# Patient Record
Sex: FEMALE | Race: WHITE | NOT HISPANIC OR LATINO | Employment: FULL TIME | URBAN - METROPOLITAN AREA
[De-identification: names, ages, dates, MRNs, and addresses within clinical notes are randomized per-mention and may not be internally consistent; named-entity substitution may affect disease eponyms.]

---

## 2022-11-01 ENCOUNTER — APPOINTMENT (OUTPATIENT)
Dept: RADIOLOGY | Facility: IMAGING CENTER | Age: 52
End: 2022-11-01
Attending: NURSE PRACTITIONER
Payer: COMMERCIAL

## 2022-11-01 ENCOUNTER — OFFICE VISIT (OUTPATIENT)
Dept: URGENT CARE | Facility: CLINIC | Age: 52
End: 2022-11-01
Payer: COMMERCIAL

## 2022-11-01 VITALS
HEART RATE: 96 BPM | HEIGHT: 70 IN | WEIGHT: 240.9 LBS | SYSTOLIC BLOOD PRESSURE: 108 MMHG | TEMPERATURE: 97.4 F | BODY MASS INDEX: 34.49 KG/M2 | DIASTOLIC BLOOD PRESSURE: 76 MMHG | RESPIRATION RATE: 16 BRPM | OXYGEN SATURATION: 97 %

## 2022-11-01 DIAGNOSIS — S99.911A INJURY OF RIGHT ANKLE, INITIAL ENCOUNTER: ICD-10-CM

## 2022-11-01 DIAGNOSIS — S93.401A SPRAIN OF RIGHT ANKLE, UNSPECIFIED LIGAMENT, INITIAL ENCOUNTER: ICD-10-CM

## 2022-11-01 DIAGNOSIS — L71.0 PERIORAL DERMATITIS: ICD-10-CM

## 2022-11-01 PROBLEM — F41.9 ANXIETY: Status: ACTIVE | Noted: 2018-05-15

## 2022-11-01 PROBLEM — R13.13 PHARYNGEAL DYSPHAGIA: Status: ACTIVE | Noted: 2018-12-27

## 2022-11-01 PROBLEM — M25.522 ELBOW PAIN, LEFT: Status: ACTIVE | Noted: 2017-02-08

## 2022-11-01 PROBLEM — E28.2 PCOS (POLYCYSTIC OVARIAN SYNDROME): Status: ACTIVE | Noted: 2017-05-19

## 2022-11-01 PROBLEM — G43.909 MIGRAINE: Status: ACTIVE | Noted: 2021-01-12

## 2022-11-01 PROBLEM — R07.0 THROAT DISCOMFORT: Status: ACTIVE | Noted: 2018-12-27

## 2022-11-01 PROBLEM — R12 HEARTBURN: Status: ACTIVE | Noted: 2018-05-15

## 2022-11-01 PROBLEM — M54.2 NECK PAIN: Status: ACTIVE | Noted: 2017-03-27

## 2022-11-01 PROBLEM — J32.9 SINUSITIS: Status: ACTIVE | Noted: 2018-12-06

## 2022-11-01 PROCEDURE — 73610 X-RAY EXAM OF ANKLE: CPT | Mod: TC,RT | Performed by: NURSE PRACTITIONER

## 2022-11-01 PROCEDURE — 99203 OFFICE O/P NEW LOW 30 MIN: CPT | Performed by: NURSE PRACTITIONER

## 2022-11-01 RX ORDER — LEVOTHYROXINE SODIUM 88 UG/1
88 TABLET ORAL
COMMUNITY
End: 2022-12-05

## 2022-11-01 RX ORDER — ARIPIPRAZOLE 5 MG/1
5 TABLET ORAL DAILY
COMMUNITY
Start: 2022-10-15

## 2022-11-01 RX ORDER — LORAZEPAM 1 MG/1
1 TABLET ORAL EVERY 4 HOURS PRN
COMMUNITY

## 2022-11-01 RX ORDER — LORAZEPAM 1 MG/1
1 TABLET ORAL
COMMUNITY
End: 2022-12-05

## 2022-11-01 RX ORDER — LEVOTHYROXINE SODIUM 0.12 MG/1
1 TABLET ORAL EVERY MORNING
COMMUNITY
Start: 2022-10-13 | End: 2022-12-05

## 2022-11-01 RX ORDER — FLUOXETINE HYDROCHLORIDE 20 MG/1
20 CAPSULE ORAL 2 TIMES DAILY
COMMUNITY
End: 2022-12-05

## 2022-11-01 NOTE — PROGRESS NOTES
Chief Complaint   Patient presents with    Ankle Injury     (R) x 3 days after tripping on a curb.     Rash     X 3 weeks on mouth.       HISTORY OF PRESENT ILLNESS: Patient is a pleasant 52 y.o. female who presents to urgent care today with complaints of right ankle injury.  Patient notes she excellently tripped on a curb 3 days ago, twisting her ankle.  She has had pain to her ankle since, more laterally.  Denies any numbness, tingling.  Denies other areas of injury or trauma.  She has tried ice for symptom relief.  Denies previous injuries to this ankle.  Furthermore, notes rash to bilateral aspects of her mouth for the past 3 weeks.  The rash is nonpruritic, somewhat burning.  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  Denies previous history of the same.    Patient Active Problem List    Diagnosis Date Noted    Migraine 01/12/2021    Pharyngeal dysphagia 12/27/2018    Throat discomfort 12/27/2018    Sinusitis 12/06/2018    Anxiety 05/15/2018    Heartburn 05/15/2018    PCOS (polycystic ovarian syndrome) 05/19/2017    Neck pain 03/27/2017    Elbow pain, left 02/08/2017    Symptomatic PVCs 07/07/2015    Type 2 diabetes mellitus without complications (HCC) 08/25/2014    Nystagmus associated with disorders of the vestibular system 11/30/2011    Papanicolaou smear of cervix with atypical squamous cells of undetermined significance (ASC-US) 12/24/2009    Premenstrual tension syndrome 11/11/2009    Trochanteric bursitis of right hip 09/21/2007    Gastroesophageal reflux disease 02/27/2007    Hypothyroidism 02/27/2007    Obesity 01/28/2004    Recurrent major depressive disorder, in remission (HCC) 08/09/2000    Intracranial injury 08/24/1999       Allergies:Patient has no known allergies.    Current Outpatient Medications Ordered in Epic   Medication Sig Dispense Refill    FLUoxetine (PROZAC) 20 MG Cap Take 20 mg by mouth 2 times a day.      LORazepam (ATIVAN) 1 MG Tab Take 1 mg by mouth every four hours as needed for Anxiety.    "   levothyroxine (SYNTHROID) 88 MCG Tab Take 88 mcg by mouth every morning on an empty stomach.      mupirocin (BACTROBAN) 2 % Ointment Apply 1 Application topically 3 times a day for 10 days. 1 g 0    ARIPiprazole (ABILIFY) 5 MG tablet Take 5 mg by mouth every day.      diphenhydramine (SOMINEX) 25 MG tablet Take 25 mg by mouth.      levothyroxine (SYNTHROID) 125 MCG Tab TAKE 1 TABLET BY MOUTH DAILY BEFORE BREAKFAST      LORazepam (ATIVAN) 1 MG Tab Take 1 mg by mouth.       No current Epic-ordered facility-administered medications on file.       History reviewed. No pertinent past medical history.    Social History     Tobacco Use    Smoking status: Never    Smokeless tobacco: Never   Vaping Use    Vaping Use: Never used   Substance Use Topics    Alcohol use: Yes    Drug use: Never       No family status information on file.   History reviewed. No pertinent family history.    ROS:  Review of Systems   Constitutional: Negative for fever, chills, weight loss, malaise, and fatigue.   HENT: Negative for ear pain, nosebleeds, congestion, sore throat and neck pain.    Eyes: Negative for vision changes.   Neuro: Negative for headache, sensory changes, weakness, seizure, LOC.   Cardiovascular: Negative for chest pain, palpitations, orthopnea and leg swelling.   Respiratory: Negative for cough, sputum production, shortness of breath and wheezing.   Gastrointestinal: Negative for abdominal pain, nausea, vomiting or diarrhea.   Genitourinary: Negative for dysuria, urgency and frequency.  Musculoskeletal: Positive for falls, ankle pain and swelling.  Negative for neck pain, back pain, myalgias.   Skin: Positive for rash.   Negative for diaphoresis.     Exam:  /76   Pulse 96   Temp 36.3 °C (97.4 °F) (Temporal)   Resp 16   Ht 1.778 m (5' 10\")   Wt 109 kg (240 lb 14.4 oz)   SpO2 97%   General: well-nourished, well-developed female in NAD  Head: normocephalic, atraumatic  Eyes: PERRLA, no conjunctival injection, acuity " "grossly intact, lids normal.  Ears: normal shape and symmetry, no tenderness, no discharge. External canals are without any significant edema or erythema. Tympanic membranes are without any inflammation, no effusion. Gross auditory acuity is intact.  Nose: symmetrical without tenderness, no discharge.  Mouth/Throat: reasonable hygiene, no erythema, exudates or tonsillar enlargement.  Neck: no masses, range of motion within normal limits, no tracheal deviation. No obvious thyroid enlargement.   Lymph: no cervical adenopathy. No supraclavicular adenopathy.   Neuro: alert and oriented. Cranial nerves 1-12 grossly intact. No sensory deficit.   Cardiovascular: regular rate and rhythm. No edema.  Pulmonary: no distress. Chest is symmetrical with respiration, no wheezes, crackles, or rhonchi.   Musculoskeletal: no clubbing, appropriate muscle tone, gait is antalgic.  Right ankle: Moderate swelling with tenderness to the lateral aspect as well as ATFL.  Limited range of motion all directions.  Neurovascular intact.  Cap refill is brisk.  Right foot is moderately swollen without any tenderness.  Skin: warm, dry, intact, no clubbing, no cyanosis, no rashes.   Psych: appropriate mood, affect, judgement.     Dx right ankle radiology reading \"No radiographic evidence of acute traumatic bony injury.\"    Assessment/Plan:  1. Sprain of right ankle, unspecified ligament, initial encounter  DX-ANKLE 3+ VIEWS RIGHT    Referral to Orthopedics      2. Perioral dermatitis  mupirocin (BACTROBAN) 2 % Ointment            Patient presents with right ankle injury, x-ray negative, suspect sprain.  Patient placed in a lace up brace, crutches for comfort.  RICE.  OTC NSAIDs.  Referral to orthopedics placed.  Furthermore, perioral dermatitis will be treated with Bactroban.  If no improvement, may trial OTC hydrocortisone.  Supportive care, differential diagnoses, and indications for immediate follow-up discussed with patient.   Pathogenesis of " diagnosis discussed including typical length and natural progression.   Instructed to return to clinic or nearest emergency department for any change in condition, further concerns, or worsening of symptoms.  Patient states understanding of the plan of care and discharge instructions.  Instructed to make an appointment, for follow up, with her primary care provider.        Please note that this dictation was created using voice recognition software. I have made every reasonable attempt to correct obvious errors, but I expect that there are errors of grammar and possibly content that I did not discover before finalizing the note.      TIFFANIE Hutchins.

## 2022-12-02 ENCOUNTER — TELEPHONE (OUTPATIENT)
Dept: SCHEDULING | Facility: IMAGING CENTER | Age: 52
End: 2022-12-02

## 2022-12-04 SDOH — ECONOMIC STABILITY: HOUSING INSECURITY
IN THE LAST 12 MONTHS, WAS THERE A TIME WHEN YOU DID NOT HAVE A STEADY PLACE TO SLEEP OR SLEPT IN A SHELTER (INCLUDING NOW)?: NO

## 2022-12-04 SDOH — ECONOMIC STABILITY: TRANSPORTATION INSECURITY
IN THE PAST 12 MONTHS, HAS LACK OF TRANSPORTATION KEPT YOU FROM MEETINGS, WORK, OR FROM GETTING THINGS NEEDED FOR DAILY LIVING?: NO

## 2022-12-04 SDOH — ECONOMIC STABILITY: TRANSPORTATION INSECURITY
IN THE PAST 12 MONTHS, HAS THE LACK OF TRANSPORTATION KEPT YOU FROM MEDICAL APPOINTMENTS OR FROM GETTING MEDICATIONS?: NO

## 2022-12-04 SDOH — ECONOMIC STABILITY: HOUSING INSECURITY: IN THE LAST 12 MONTHS, HOW MANY PLACES HAVE YOU LIVED?: 1

## 2022-12-04 SDOH — ECONOMIC STABILITY: FOOD INSECURITY: WITHIN THE PAST 12 MONTHS, YOU WORRIED THAT YOUR FOOD WOULD RUN OUT BEFORE YOU GOT MONEY TO BUY MORE.: NEVER TRUE

## 2022-12-04 SDOH — ECONOMIC STABILITY: FOOD INSECURITY: WITHIN THE PAST 12 MONTHS, THE FOOD YOU BOUGHT JUST DIDN'T LAST AND YOU DIDN'T HAVE MONEY TO GET MORE.: NEVER TRUE

## 2022-12-04 SDOH — HEALTH STABILITY: PHYSICAL HEALTH: ON AVERAGE, HOW MANY MINUTES DO YOU ENGAGE IN EXERCISE AT THIS LEVEL?: PATIENT DECLINED

## 2022-12-04 SDOH — HEALTH STABILITY: PHYSICAL HEALTH
ON AVERAGE, HOW MANY DAYS PER WEEK DO YOU ENGAGE IN MODERATE TO STRENUOUS EXERCISE (LIKE A BRISK WALK)?: PATIENT DECLINED

## 2022-12-04 SDOH — ECONOMIC STABILITY: INCOME INSECURITY: IN THE LAST 12 MONTHS, WAS THERE A TIME WHEN YOU WERE NOT ABLE TO PAY THE MORTGAGE OR RENT ON TIME?: NO

## 2022-12-04 SDOH — ECONOMIC STABILITY: INCOME INSECURITY: HOW HARD IS IT FOR YOU TO PAY FOR THE VERY BASICS LIKE FOOD, HOUSING, MEDICAL CARE, AND HEATING?: SOMEWHAT HARD

## 2022-12-04 SDOH — HEALTH STABILITY: MENTAL HEALTH
STRESS IS WHEN SOMEONE FEELS TENSE, NERVOUS, ANXIOUS, OR CAN'T SLEEP AT NIGHT BECAUSE THEIR MIND IS TROUBLED. HOW STRESSED ARE YOU?: TO SOME EXTENT

## 2022-12-04 SDOH — ECONOMIC STABILITY: TRANSPORTATION INSECURITY
IN THE PAST 12 MONTHS, HAS LACK OF RELIABLE TRANSPORTATION KEPT YOU FROM MEDICAL APPOINTMENTS, MEETINGS, WORK OR FROM GETTING THINGS NEEDED FOR DAILY LIVING?: NO

## 2022-12-04 ASSESSMENT — LIFESTYLE VARIABLES
HOW MANY STANDARD DRINKS CONTAINING ALCOHOL DO YOU HAVE ON A TYPICAL DAY: PATIENT DECLINED
HOW OFTEN DO YOU HAVE A DRINK CONTAINING ALCOHOL: 2-3 TIMES A WEEK
AUDIT-C TOTAL SCORE: -1
HOW OFTEN DO YOU HAVE SIX OR MORE DRINKS ON ONE OCCASION: LESS THAN MONTHLY
SKIP TO QUESTIONS 9-10: 0

## 2022-12-04 ASSESSMENT — SOCIAL DETERMINANTS OF HEALTH (SDOH)
HOW OFTEN DO YOU GET TOGETHER WITH FRIENDS OR RELATIVES?: ONCE A WEEK
DO YOU BELONG TO ANY CLUBS OR ORGANIZATIONS SUCH AS CHURCH GROUPS UNIONS, FRATERNAL OR ATHLETIC GROUPS, OR SCHOOL GROUPS?: NO
HOW OFTEN DO YOU ATTEND CHURCH OR RELIGIOUS SERVICES?: NEVER
WITHIN THE PAST 12 MONTHS, YOU WORRIED THAT YOUR FOOD WOULD RUN OUT BEFORE YOU GOT THE MONEY TO BUY MORE: NEVER TRUE
IN A TYPICAL WEEK, HOW MANY TIMES DO YOU TALK ON THE PHONE WITH FAMILY, FRIENDS, OR NEIGHBORS?: MORE THAN THREE TIMES A WEEK
HOW OFTEN DO YOU ATTEND CHURCH OR RELIGIOUS SERVICES?: NEVER
HOW OFTEN DO YOU HAVE A DRINK CONTAINING ALCOHOL: 2-3 TIMES A WEEK
HOW MANY DRINKS CONTAINING ALCOHOL DO YOU HAVE ON A TYPICAL DAY WHEN YOU ARE DRINKING: PATIENT DECLINED
HOW HARD IS IT FOR YOU TO PAY FOR THE VERY BASICS LIKE FOOD, HOUSING, MEDICAL CARE, AND HEATING?: SOMEWHAT HARD
DO YOU BELONG TO ANY CLUBS OR ORGANIZATIONS SUCH AS CHURCH GROUPS UNIONS, FRATERNAL OR ATHLETIC GROUPS, OR SCHOOL GROUPS?: NO
HOW OFTEN DO YOU GET TOGETHER WITH FRIENDS OR RELATIVES?: ONCE A WEEK
HOW OFTEN DO YOU HAVE SIX OR MORE DRINKS ON ONE OCCASION: LESS THAN MONTHLY
IN A TYPICAL WEEK, HOW MANY TIMES DO YOU TALK ON THE PHONE WITH FAMILY, FRIENDS, OR NEIGHBORS?: MORE THAN THREE TIMES A WEEK

## 2022-12-05 ENCOUNTER — OFFICE VISIT (OUTPATIENT)
Dept: MEDICAL GROUP | Facility: IMAGING CENTER | Age: 52
End: 2022-12-05
Payer: COMMERCIAL

## 2022-12-05 VITALS
RESPIRATION RATE: 14 BRPM | OXYGEN SATURATION: 97 % | TEMPERATURE: 97 F | WEIGHT: 242.6 LBS | SYSTOLIC BLOOD PRESSURE: 132 MMHG | DIASTOLIC BLOOD PRESSURE: 88 MMHG | BODY MASS INDEX: 34.73 KG/M2 | HEIGHT: 70 IN | HEART RATE: 84 BPM

## 2022-12-05 DIAGNOSIS — Z23 NEED FOR VACCINATION: ICD-10-CM

## 2022-12-05 DIAGNOSIS — F41.9 ANXIETY: ICD-10-CM

## 2022-12-05 DIAGNOSIS — J02.9 SORE THROAT: ICD-10-CM

## 2022-12-05 DIAGNOSIS — E03.9 HYPOTHYROIDISM, UNSPECIFIED TYPE: ICD-10-CM

## 2022-12-05 DIAGNOSIS — F33.1 MODERATE EPISODE OF RECURRENT MAJOR DEPRESSIVE DISORDER (HCC): ICD-10-CM

## 2022-12-05 DIAGNOSIS — E66.9 OBESITY (BMI 30.0-34.9): ICD-10-CM

## 2022-12-05 DIAGNOSIS — J40 BRONCHITIS: ICD-10-CM

## 2022-12-05 PROBLEM — E28.2 POLYCYSTIC OVARY SYNDROME: Status: ACTIVE | Noted: 2018-05-15

## 2022-12-05 LAB
INT CON NEG: NORMAL
INT CON POS: NORMAL
S PYO AG THROAT QL: NEGATIVE

## 2022-12-05 PROCEDURE — 90471 IMMUNIZATION ADMIN: CPT

## 2022-12-05 PROCEDURE — 87880 STREP A ASSAY W/OPTIC: CPT

## 2022-12-05 PROCEDURE — 99214 OFFICE O/P EST MOD 30 MIN: CPT | Mod: 25

## 2022-12-05 PROCEDURE — 90686 IIV4 VACC NO PRSV 0.5 ML IM: CPT

## 2022-12-05 RX ORDER — TRAZODONE HYDROCHLORIDE 50 MG/1
TABLET ORAL
COMMUNITY

## 2022-12-05 RX ORDER — METFORMIN HYDROCHLORIDE 500 MG/1
TABLET, EXTENDED RELEASE ORAL
COMMUNITY
End: 2022-12-09

## 2022-12-05 RX ORDER — FLUOXETINE HYDROCHLORIDE 40 MG/1
CAPSULE ORAL
COMMUNITY
End: 2022-12-05

## 2022-12-05 RX ORDER — LEVOTHYROXINE SODIUM 0.15 MG/1
TABLET ORAL
COMMUNITY
End: 2022-12-05

## 2022-12-05 RX ORDER — LEVOTHYROXINE SODIUM 0.12 MG/1
125 TABLET ORAL
COMMUNITY
End: 2023-02-06 | Stop reason: SDUPTHER

## 2022-12-05 RX ORDER — BENZONATATE 100 MG/1
100 CAPSULE ORAL 3 TIMES DAILY PRN
Qty: 60 CAPSULE | Refills: 0 | Status: SHIPPED | OUTPATIENT
Start: 2022-12-05 | End: 2024-01-09 | Stop reason: SDUPTHER

## 2022-12-05 RX ORDER — FLUOXETINE HYDROCHLORIDE 40 MG/1
40 CAPSULE ORAL 2 TIMES DAILY
Qty: 180 CAPSULE | Refills: 3 | Status: SHIPPED | OUTPATIENT
Start: 2022-12-05 | End: 2023-03-16 | Stop reason: SDUPTHER

## 2022-12-05 RX ORDER — ALBUTEROL SULFATE 90 UG/1
2 AEROSOL, METERED RESPIRATORY (INHALATION) EVERY 4 HOURS PRN
Qty: 1 EACH | Refills: 3 | Status: SHIPPED | OUTPATIENT
Start: 2022-12-05

## 2022-12-05 ASSESSMENT — ANXIETY QUESTIONNAIRES
GAD7 TOTAL SCORE: 11
7. FEELING AFRAID AS IF SOMETHING AWFUL MIGHT HAPPEN: MORE THAN HALF THE DAYS
6. BECOMING EASILY ANNOYED OR IRRITABLE: SEVERAL DAYS
2. NOT BEING ABLE TO STOP OR CONTROL WORRYING: SEVERAL DAYS
4. TROUBLE RELAXING: MORE THAN HALF THE DAYS
5. BEING SO RESTLESS THAT IT IS HARD TO SIT STILL: SEVERAL DAYS
3. WORRYING TOO MUCH ABOUT DIFFERENT THINGS: MORE THAN HALF THE DAYS
1. FEELING NERVOUS, ANXIOUS, OR ON EDGE: MORE THAN HALF THE DAYS

## 2022-12-05 ASSESSMENT — PATIENT HEALTH QUESTIONNAIRE - PHQ9
5. POOR APPETITE OR OVEREATING: 1 - SEVERAL DAYS
CLINICAL INTERPRETATION OF PHQ2 SCORE: 3
SUM OF ALL RESPONSES TO PHQ QUESTIONS 1-9: 10

## 2022-12-05 NOTE — PATIENT INSTRUCTIONS
My recommendations for an upper respiratory infection:  - increase fluid intake, plenty of rest, and frequent hand washing  - Use a humidifier, especially at night. Cold or warm water humidifiers have the same effect.  - Tai Med squeeze bottle sinus rinses or plain nasal saline twice a day.  - Hot tea + honey + fresh lemon juice  - Honey by itself has been shown to help provide cough relief  - use warm salt water gargles, over-the-counter Cepacol throat lozenges for sore throat  - take OTC immune booster supplementation that contains Vitamin C, D, E, zinc, B vitamins.  - take Mucinex as needed during the day and use throat lozenges such as Ricola. Use dextromethorphan for cough only at night to allow the body to expel the pathogen during the day.  Take 5-10 deep breaths intermittently throughout the day and move the body as tolerated to aid in respiration.  -Take tylenol or Ibuprofen as needed for fever and pain.   Instructed to go to ED for worsening symptoms, persistent fevers, facial swelling, visual changes, weakness, elevated heart rate, stiff neck, prolonged cough, persistent wheezing, leg swelling, or any other concerns. Seek emergency medical care immediately for: Trouble breathing, persistent pain or pressure in the chest, confusion, inability to wake or stay awake, bluish lips or face, persistent tachycardia (fast heart rate), prolonged dizziness, persistent high grade fevers.

## 2022-12-05 NOTE — LETTER
[facility] [address]  [facility]  [address]  [place] [address]     December 5, 2022    Patient: Luz Zuñiga   YOB: 1970   Date of Visit: 12/5/2022       To Whom It May Concern:    Luz Zuñiga was seen and treated in our department on 12/5/2022. Please excuse Ms. Zuñiag from work on 11/29-12/1. Her current condition required plenty of rest to recover and also to prevent possibly spreading the pathogen that is causing her symptoms. It is also advisable for her to work from home this week as needed.    Sincerely,     TIFFANIE Mckay.                
English

## 2022-12-05 NOTE — PROGRESS NOTES
Chief Complaint   Patient presents with    Establish Care    Cough     Fatigue, fever last week, did not sleep well     Pharyngitis     Burning and dryness     HISTORY OF THE PRESENT ILLNESS: Patient is a 52 y.o. female. This pleasant patient is here today to establish care and to discuss:    To establish care:  Previous PCP Dr. Crowley from [place]  Moved here from [place] last year    Cough  Onset of symptoms started last Monday. Her symptoms continue to worsen where she developed productive, paroxysmal cough with sore throat and fever.She describes her sore throat as burning and dryness. Her fever resolved by Monday; however, her cough remained persistent.  He coughing episodes make it difficult for her to sleep at night which is causing fatigue.   Her sore throat is not worsening, likely r/t to her incessant coughing episodes.  She has episodes of wheezing. She has not history of asthma. She does become short of breath when walking up flight of stairs. She has taken multiple Covid tests with negative results. She takes OTC night time cold and flu without relief.  She is not a smoker.    Denies fevers, chills, malaise, facial swelling, visual changes, weakness, elevated heart rate, stiff neck, persistent wheezing, leg swelling, trouble breathing, persistent pain or pressure in the chest, confusion, inability to wake or stay awake, bluish lips or face, persistent tachycardia (fast heart rate), prolonged dizziness, or fainting.    Hypothyroid  Established issue. Patient TSH 3.92 on 7/13/2021. Patient currently takes levothyroxine 125 mcg daily. However, patient reports of having fatigue and hair thinning. She attributes this to stress as she is currently going through a divorce.     Moderate episode of recurrent major depressive disorder   Anxiety  Established condition. Patient is suppose to be on Prozac 80 mg daily. However, her pharmacy has not been able to fill her appropriate dosage. Therefore, she has been under  dosing herself. Patient moved here from [place] a year ago and is currently going through a divorce.   Denies intent to harm self of others. NO recent mental health hospitalizations.      Depression Screening    Little interest or pleasure in doing things?  2 - more than half the days   Feeling down, depressed , or hopeless? 1 - several days   Trouble falling or staying asleep, or sleeping too much?  2 - more than half the days   Feeling tired or having little energy?  2 - more than half the days   Poor appetite or overeating?  1 - several days   Feeling bad about yourself - or that you are a failure or have let yourself or your family down? 1 - several days   Trouble concentrating on things, such as reading the newspaper or watching television? 1 - several days   Moving or speaking so slowly that other people could have noticed.  Or the opposite - being so fidgety or restless that you have been moving around a lot more than usual?  0 - not at all   Thoughts that you would be better off dead, or of hurting yourself?  0 - not at all   Patient Health Questionnaire Score: 10       If depressive symptoms identified deferred to follow up visit unless specifically addressed in assesment and plan.    Interpretation of PHQ-9 Total Score   Score Severity   1-4 No Depression   5-9 Mild Depression   10-14 Moderate Depression   15-19 Moderately Severe Depression   20-27 Severe Depression    KAMLESH-7 Questionnaire    Feeling nervous, anxious, or on edge: More than half the days  Not being able to sop or control worrying: Several days  Worrying too much about different things: More than half the days  Trouble relaxing: More than half the days  Being so restless that it's hard to sit still: Several days  Becoming easily annoyed or irritable: Several days  Feeling afraid as if something awful might happen: More than half the days  Total: 11    Interpretation of KAMLESH 7 Total Score   Score Severity :  0-4 No Anxiety   5-9 Mild  Anxiety  10-14 Moderate Anxiety  15-21 Severe Anxiety     Allergies: Patient has no known allergies.    Current Outpatient Medications Ordered in Epic   Medication Sig Dispense Refill    fluoxetine (PROZAC) 40 MG capsule Take 1 Capsule by mouth 2 times a day for 90 days. 180 Capsule 3    albuterol 108 (90 Base) MCG/ACT Aero Soln inhalation aerosol Inhale 2 Puffs every four hours as needed for Shortness of Breath. 1 Each 3    benzonatate (TESSALON) 100 MG Cap Take 1 Capsule by mouth 3 times a day as needed for Cough. 60 Capsule 0    levothyroxine (SYNTHROID) 125 MCG Tab Take 125 mcg by mouth every morning on an empty stomach.      LORazepam (ATIVAN) 1 MG Tab Take 1 mg by mouth every four hours as needed for Anxiety.      metFORMIN ER (GLUCOPHAGE XR) 500 MG TABLET SR 24 HR metformin  mg tablet,extended release 24 hr      traZODone (DESYREL) 50 MG Tab trazodone 50 mg tablet      ARIPiprazole (ABILIFY) 5 MG tablet Take 5 mg by mouth every day. (Patient not taking: Reported on 12/5/2022)      diphenhydramine (SOMINEX) 25 MG tablet Take 25 mg by mouth.       No current Epic-ordered facility-administered medications on file.       Past Medical History:   Diagnosis Date    Anxiety     Depression     Thyroid disease        Past Surgical History:   Procedure Laterality Date    LUMPECTOMY         Social History     Tobacco Use    Smoking status: Never    Smokeless tobacco: Never   Vaping Use    Vaping Use: Never used   Substance Use Topics    Alcohol use: Yes    Drug use: Never       Social History     Social History Narrative    Not on file       Family History   Problem Relation Age of Onset    Cancer Mother     Heart Disease Father        ROS: per hpi    - Constitutional:Negative for fever, chills, unexpected weight change, and generalized weakness. +fatigue    - HEENT: Negative for headaches, vision changes, hearing changes, ear pain, ear discharge, rhinorrhea, sinus congestion, and neck pain.  +sore throat    -  "Respiratory: + cough, sputum production, chest congestion, dyspnea, wheezing. Negative for crackles.      - Cardiovascular: Negative for chest pain, palpitations, orthopnea, and bilateral lower extremity edema.     - Gastrointestinal: Negative for heartburn, nausea, vomiting, abdominal pain, hematochezia, melena, diarrhea, constipation, and greasy/foul-smelling stools.     - Genitourinary: Negative for dysuria, polyuria, hematuria, pyuria, urinary urgency, and urinary incontinence.     - Musculoskeletal: Negative for myalgias, back pain, and joint pain.     - Skin: Negative for rash, itching, cyanotic skin color change.     - Neurological: Negative for dizziness, tingling, tremors, focal sensory deficit, focal weakness and headaches.     - Endo/Heme/Allergies: Does not bruise/bleed easily.     - Psychiatric/Behavioral: Negative suicidal/homicidal ideation and memory loss.        - NOTE: All other systems reviewed and are negative, except as in HPI.      Exam: /88 (BP Location: Right arm, Patient Position: Sitting, BP Cuff Size: Adult)   Pulse 84   Temp 36.1 °C (97 °F) (Temporal)   Resp 14   Ht 1.778 m (5' 10\")   Wt 110 kg (242 lb 9.6 oz)   SpO2 97%  Body mass index is 34.81 kg/m².    General: Normal appearing. No distress.  HEENT: Normocephalic. Eyes conjunctiva clear lids without ptosis, ears normal shape and contour,nasal mucosa benign, oropharynx is without erythema, edema or exudates.   Neck: Supple. Thyroid is not enlarged.  Pulmonary: Diminished to ausculation.  Normal effort. No rales, ronchi, or wheezing.  Cardiovascular: Regular rate and rhythm without murmur. Carotid and radial pulses are intact and equal bilaterally.  Abdomen: Soft, nontender, nondistended. Normal bowel sounds.  Neurologic: Grossly nonfocal  Lymph: No cervical, supraclavicular lymph nodes are palpable  Skin: Warm and dry.  No obvious lesions.  Musculoskeletal: Normal gait. No extremity cyanosis, clubbing, or edema.  Psych: " Normal mood and affect. Alert and oriented x3. Judgment and insight is normal.    Please note that this dictation was created using voice recognition software. I have made every reasonable attempt to correct obvious errors, but I expect that there are errors of grammar and possibly content that I did not discover before finalizing the note.      Assessment/Plan    52 y.o. female with the following -      1. Bronchitis  Established condition. Patient presentation consistent with viral bronchitis. Vital signs are WNL, no s/s of hypoxia or respiratory compromise. Pt is nontoxic appearing.   Will treat with Tessalon Perles for cough.  Will treat wheezing and bronchospasm with albuterol inhaler. Discussed medication desired effects, potential side effects, and how to administer medication (tessalon perles and albuterol).   Recommend supportive care:  - increase fluid intake, plenty of rest, and frequent hand washing  - Use a humidifier, especially at night. Cold or warm water humidifiers have the same effect.  - Hot tea + honey + fresh lemon juice  - Honey by itself has been shown to help provide cough relief  - use warm salt water gargles, over-the-counter Cepacol throat lozenges for sore throat  - take OTC immune booster supplementation that contains Vitamin C, D, E, zinc, B vitamins.  - take Mucinex as needed during the day and use throat lozenges such as Ricola. Use dextromethorphan for cough only at night to allow the body to expel the pathogen during the day.  Take 5-10 deep breaths intermittently throughout the day and move the body as tolerated to aid in respiration.  -Take tylenol or Ibuprofen as needed for fever and pain.   Instructed to go to ED for worsening symptoms, persistent fevers, facial swelling, visual changes, weakness, elevated heart rate, stiff neck, prolonged cough, persistent wheezing, leg swelling, or any other concerns. Seek emergency medical care immediately for: Trouble breathing, persistent  pain or pressure in the chest, confusion, inability to wake or stay awake, bluish lips or face, persistent tachycardia (fast heart rate), prolonged dizziness, persistent high grade fevers.    - albuterol 108 (90 Base) MCG/ACT Aero Soln inhalation aerosol; Inhale 2 Puffs every four hours as needed for Shortness of Breath.  Dispense: 1 Each; Refill: 3  - benzonatate (TESSALON) 100 MG Cap; Take 1 Capsule by mouth 3 times a day as needed for Cough.  Dispense: 60 Capsule; Refill: 0  - VITAMIN D,25 HYDROXY (DEFICIENCY); Future    2. Sore throat  Likely due to bronchitis, strep ruled out-negative rapid strep in office.    - POCT Rapid Strep A    3. Obesity (BMI 30.0-34.9)  Discussed lifestyle and dietary changes such as low-carb diet, high in vegetables and fresh fruits.  Encouraged to increase water intake.  Regular physical exercise at least 30 minutes/day 5 days a week. Weight reduction if applicable (aim for 5% to 10% body weight increments). Patient is at an increased risk for serious conditions such as heart disease, type 2 diabetes, TRELL, certain types of cancers, gallbladder disease, and circulation problems. Will order labs to rule out.     - CBC WITH DIFFERENTIAL; Future  - Comp Metabolic Panel; Future  - Lipid Profile; Future  - TSH WITH REFLEX TO FT4; Future  - VITAMIN D,25 HYDROXY (DEFICIENCY); Future  - HEMOGLOBIN A1C; Future    4. Moderate episode of recurrent major depressive disorder (HCC)  5. Anxiety  Chronic condition with recurrent episode. PHQ-9 score is 10, moderate depression. Patient is currently going through a divorce which could be triggering this. However, patient has been under dosing on Prozac which could be contributing to this as well. Will send prescription for 80 mg to pharmacy.  Discussed medications desired effects, potential side effects, and how to administer medication.  Discussed nonpharmacological interventions such as seeing a therapist, stress reduction, mindful techniques, diet  and exercise.    - fluoxetine (PROZAC) 40 MG capsule; Take 1 Capsule by mouth 2 times a day for 90 days.  Dispense: 180 Capsule; Refill: 3  - CBC WITH DIFFERENTIAL; Future  - TSH WITH REFLEX TO FT4; Future    6. Hypothyroidism, unspecified type  Chronic condition, currently symptomatic. Last TSH 3.92 on 7/13/2021. Patient reports fatigue and hair thinning. Will order TSH.    - TSH WITH REFLEX TO FT4; Future    7. Need for vaccination    - INFLUENZA VACCINE QUAD INJ (PF)    Medical Decision Making/Course:  In the course of preparing for this visit with review of the pertinent past medical history, recent and past clinic visits, current medications, and performing chart, immunization, medical history and medication reconciliation, and in the further course of obtaining the current history pertinent to the clinic visit today, performing an exam and evaluation, ordering and independently evaluating labs, tests, and/or procedures, prescribing any recommended new medications as noted above, providing any pertinent counseling and education and recommending further coordination of care. This was discussed with patient in a shared-decision making conversation, and they understand and agreed with plan of care.     Return in about 1 year (around 12/5/2023), or if symptoms worsen or fail to improve.    Thank you, Katja MADERA  [facility]        Please note that this dictation was created using voice recognition software. I have made every reasonable attempt to correct obvious errors, but I expect that there are errors of grammar and possibly content that I did not discover before finalizing the note.

## 2022-12-09 ENCOUNTER — OFFICE VISIT (OUTPATIENT)
Dept: MEDICAL GROUP | Facility: IMAGING CENTER | Age: 52
End: 2022-12-09
Payer: COMMERCIAL

## 2022-12-09 VITALS
WEIGHT: 241.6 LBS | OXYGEN SATURATION: 97 % | HEART RATE: 86 BPM | SYSTOLIC BLOOD PRESSURE: 118 MMHG | TEMPERATURE: 97.4 F | DIASTOLIC BLOOD PRESSURE: 86 MMHG | BODY MASS INDEX: 34.59 KG/M2 | RESPIRATION RATE: 14 BRPM | HEIGHT: 70 IN

## 2022-12-09 DIAGNOSIS — J40 BRONCHITIS: ICD-10-CM

## 2022-12-09 DIAGNOSIS — R21 FACIAL RASH: ICD-10-CM

## 2022-12-09 PROCEDURE — 99214 OFFICE O/P EST MOD 30 MIN: CPT

## 2022-12-09 RX ORDER — METRONIDAZOLE 7.5 MG/G
1 GEL TOPICAL 2 TIMES DAILY
Qty: 45 G | Refills: 1 | Status: SHIPPED | OUTPATIENT
Start: 2022-12-09

## 2022-12-09 RX ORDER — AZITHROMYCIN 250 MG/1
250 TABLET, FILM COATED ORAL DAILY
Qty: 6 TABLET | Refills: 0 | Status: SHIPPED | OUTPATIENT
Start: 2022-12-09 | End: 2022-12-14

## 2022-12-09 NOTE — PROGRESS NOTES
"Subjective:     CC:   Chief Complaint   Patient presents with    Rash     Pt report on the face around the mouth area and burning and spread a bit.    Cough     Follow up       HPI:   Luz presents today to discuss:    Perioral Rash  Patient developed a \"red bumpy\" rash in the perioral area that started in October. Patient was seen at  and  with perioral dermatitis and was instructed to use mupirocin abx ointment and OTC steroid cream for 2 weeks which was not effective. She was then instructed to take OTC antifungal ointment which provided some relief. However, the rash has reappeared and is worsening. It has now spread to her cheeks and forehead. She describes having constant burning, itchy, red, scaly bumps that worsens with exposure to hot water.     PCOS  Established and resolved condition. Patient was taking Metformin for PCOS which she is no longer taking as her menses have regulated. Patient was never diagnosed as being type 2 diabetic.     Bronchitis  Established condition. Patient's symptoms remain persistent without  improvement with supportive measures.  Patient is taking Mucinex,Tessalon pearls, and albuterol inhaler as needed without relief.     Denies fevers, chills, lethargy, fatigue, malaise, facial swelling, visual changes, weakness, elevated heart rate, stiff neck, persistent wheezing, leg swelling, trouble breathing, persistent pain or pressure in the chest, confusion, inability to wake or stay awake, bluish lips or face, persistent tachycardia (fast heart rate), prolonged dizziness, or fainting.      Past Medical History:   Diagnosis Date    Anxiety     Depression     Thyroid disease      Family History   Problem Relation Age of Onset    Cancer Mother     Heart Disease Father      Past Surgical History:   Procedure Laterality Date    LUMPECTOMY       Social History     Tobacco Use    Smoking status: Never    Smokeless tobacco: Never   Vaping Use    Vaping Use: Never used   Substance Use " "Topics    Alcohol use: Yes    Drug use: Never     Social History     Social History Narrative    Not on file     Current Outpatient Medications Ordered in Epic   Medication Sig Dispense Refill    metronidazole (METROGEL) 0.75 % gel Apply 1 Application topically 2 times a day. 45 g 1    azithromycin (ZITHROMAX) 250 MG Tab Take 1 Tablet by mouth every day for 5 days. 6 Tablet 0    traZODone (DESYREL) 50 MG Tab trazodone 50 mg tablet      fluoxetine (PROZAC) 40 MG capsule Take 1 Capsule by mouth 2 times a day for 90 days. 180 Capsule 3    albuterol 108 (90 Base) MCG/ACT Aero Soln inhalation aerosol Inhale 2 Puffs every four hours as needed for Shortness of Breath. 1 Each 3    benzonatate (TESSALON) 100 MG Cap Take 1 Capsule by mouth 3 times a day as needed for Cough. 60 Capsule 0    levothyroxine (SYNTHROID) 125 MCG Tab Take 125 mcg by mouth every morning on an empty stomach.      meloxicam (MOBIC) 15 MG tablet Take 1 Tablet by mouth every day for 30 days. (Patient not taking: Reported on 12/9/2022) 30 Tablet 3    LORazepam (ATIVAN) 1 MG Tab Take 1 mg by mouth every four hours as needed for Anxiety.      ARIPiprazole (ABILIFY) 5 MG tablet Take 5 mg by mouth every day. (Patient not taking: Reported on 12/5/2022)      diphenhydramine (SOMINEX) 25 MG tablet Take 25 mg by mouth.       No current Epic-ordered facility-administered medications on file.     Patient has no known allergies.    ROS: see hpi  Gen: no fevers/chills  Pulm: no sob, + cough  CV: no chest pain, no palpitations, no edema  GI: no nausea/vomiting, no diarrhea  Skin:+ rash    Objective:   Exam:  /86 (BP Location: Right arm, Patient Position: Sitting, BP Cuff Size: Adult)   Pulse 86   Temp 36.3 °C (97.4 °F) (Temporal)   Resp 14   Ht 1.778 m (5' 10\")   Wt 110 kg (241 lb 9.6 oz)   LMP 11/30/2022   SpO2 97%   BMI 34.67 kg/m²    Body mass index is 34.67 kg/m².    Gen: Alert and oriented, No apparent distress.  HEENT: Head atraumatic, " normocephalic. Pupils equal and round.  Neck: Neck is supple without lymphadenopathy.   Lungs: Normal effort, diminished lung sounds throughout bilaterally, no wheezes, rhonchi, or rales  CV: Regular rate and rhythm. No murmurs, rubs, or gallops.  ABD: +BS. Non-tender, non-distended. No rebound, rigidity, or guarding.  Ext: No clubbing, cyanosis, edema.  Skin: multiple erythematous papules on perioral, cheeks, and forehead.    Assessment & Plan:     52 y.o. female with the following -     1. Bronchitis  Established and uncontrolled condition.  Patient's symptoms remain persistent that is ongoing for 3 weeks now.  Conservative treatment has failed.  Discussed treating empirically with azithromycin for possible bacterial bronchitis.  Patient agreeable to this.  Medication administration, potential side effects discussed.  Recommend for patient to continue with conservative treatment.  If symptoms do not improve, advised to follow-up in office.  For worsening symptoms, instructed to go to ED.    - azithromycin (ZITHROMAX) 250 MG Tab; Take 1 Tablet by mouth every day for 5 days.  Dispense: 6 Tablet; Refill: 0    2. Facial rash  Established and uncontrolled condition with mupirocin, hydrocortisone, and antifungal ointment.  Patient presentation suspicious for rosacea papules.  Discussed treating with topical metronidazole for this.  Recommend for patient to avoid potential triggers such as extremes of temperature, alcohol, basic foods, and skin products with harsh chemicals.  Recommend emollients to help repair her skin, use gentle skin products, and use SPF with sun exposure.  Will place referral to dermatology for further evaluation and management.    - Referral to Dermatology  - metronidazole (METROGEL) 0.75 % gel; Apply 1 Application topically 2 times a day.  Dispense: 45 g; Refill: 1      Medical Decision Making/Course:  In the course of preparing for this visit with review of the pertinent past medical history,  recent and past clinic visits, current medications, and performing chart, immunization, medical history and medication reconciliation, and in the further course of obtaining the current history pertinent to the clinic visit today, performing an exam and evaluation, ordering and independently evaluating labs, tests, and/or procedures, prescribing any recommended new medications as noted above, providing any pertinent counseling and education and recommending further coordination of care. This was discussed with patient in a shared-decision making conversation, and they understand and agreed with plan of care.     Return if symptoms worsen or fail to improve.    MARY ANN Mckay   [facility]    Please note that this dictation was created using voice recognition software. I have made every reasonable attempt to correct obvious errors, but I expect that there are errors of grammar and possibly content that I did not discover before finalizing the note.

## 2022-12-20 ENCOUNTER — HOSPITAL ENCOUNTER (OUTPATIENT)
Dept: LAB | Facility: MEDICAL CENTER | Age: 52
End: 2022-12-20
Payer: COMMERCIAL

## 2022-12-20 DIAGNOSIS — F33.1 MODERATE EPISODE OF RECURRENT MAJOR DEPRESSIVE DISORDER (HCC): ICD-10-CM

## 2022-12-20 DIAGNOSIS — J40 BRONCHITIS: ICD-10-CM

## 2022-12-20 DIAGNOSIS — E03.9 HYPOTHYROIDISM, UNSPECIFIED TYPE: ICD-10-CM

## 2022-12-20 DIAGNOSIS — E66.9 OBESITY (BMI 30.0-34.9): ICD-10-CM

## 2022-12-20 DIAGNOSIS — F41.9 ANXIETY: ICD-10-CM

## 2022-12-20 LAB
BASOPHILS # BLD AUTO: 1.9 % (ref 0–1.8)
BASOPHILS # BLD: 0.09 K/UL (ref 0–0.12)
EOSINOPHIL # BLD AUTO: 0.37 K/UL (ref 0–0.51)
EOSINOPHIL NFR BLD: 7.8 % (ref 0–6.9)
ERYTHROCYTE [DISTWIDTH] IN BLOOD BY AUTOMATED COUNT: 45.8 FL (ref 35.9–50)
EST. AVERAGE GLUCOSE BLD GHB EST-MCNC: 120 MG/DL
HBA1C MFR BLD: 5.8 % (ref 4–5.6)
HCT VFR BLD AUTO: 44.1 % (ref 37–47)
HGB BLD-MCNC: 14.8 G/DL (ref 12–16)
IMM GRANULOCYTES # BLD AUTO: 0.02 K/UL (ref 0–0.11)
IMM GRANULOCYTES NFR BLD AUTO: 0.4 % (ref 0–0.9)
LYMPHOCYTES # BLD AUTO: 1.29 K/UL (ref 1–4.8)
LYMPHOCYTES NFR BLD: 27.2 % (ref 22–41)
MCH RBC QN AUTO: 32 PG (ref 27–33)
MCHC RBC AUTO-ENTMCNC: 33.6 G/DL (ref 33.6–35)
MCV RBC AUTO: 95.5 FL (ref 81.4–97.8)
MONOCYTES # BLD AUTO: 0.31 K/UL (ref 0–0.85)
MONOCYTES NFR BLD AUTO: 6.5 % (ref 0–13.4)
NEUTROPHILS # BLD AUTO: 2.67 K/UL (ref 2–7.15)
NEUTROPHILS NFR BLD: 56.2 % (ref 44–72)
NRBC # BLD AUTO: 0 K/UL
NRBC BLD-RTO: 0 /100 WBC
PLATELET # BLD AUTO: 493 K/UL (ref 164–446)
PMV BLD AUTO: 10.8 FL (ref 9–12.9)
RBC # BLD AUTO: 4.62 M/UL (ref 4.2–5.4)
WBC # BLD AUTO: 4.8 K/UL (ref 4.8–10.8)

## 2022-12-20 PROCEDURE — 80061 LIPID PANEL: CPT

## 2022-12-20 PROCEDURE — 85025 COMPLETE CBC W/AUTO DIFF WBC: CPT

## 2022-12-20 PROCEDURE — 84443 ASSAY THYROID STIM HORMONE: CPT

## 2022-12-20 PROCEDURE — 36415 COLL VENOUS BLD VENIPUNCTURE: CPT

## 2022-12-20 PROCEDURE — 80053 COMPREHEN METABOLIC PANEL: CPT

## 2022-12-20 PROCEDURE — 82306 VITAMIN D 25 HYDROXY: CPT

## 2022-12-20 PROCEDURE — 83036 HEMOGLOBIN GLYCOSYLATED A1C: CPT

## 2022-12-21 LAB
25(OH)D3 SERPL-MCNC: 26 NG/ML (ref 30–100)
ALBUMIN SERPL BCP-MCNC: 4.3 G/DL (ref 3.2–4.9)
ALBUMIN/GLOB SERPL: 1.4 G/DL
ALP SERPL-CCNC: 84 U/L (ref 30–99)
ALT SERPL-CCNC: 47 U/L (ref 2–50)
ANION GAP SERPL CALC-SCNC: 12 MMOL/L (ref 7–16)
AST SERPL-CCNC: 30 U/L (ref 12–45)
BILIRUB SERPL-MCNC: 0.6 MG/DL (ref 0.1–1.5)
BUN SERPL-MCNC: 15 MG/DL (ref 8–22)
CALCIUM ALBUM COR SERPL-MCNC: 9.5 MG/DL (ref 8.5–10.5)
CALCIUM SERPL-MCNC: 9.7 MG/DL (ref 8.5–10.5)
CHLORIDE SERPL-SCNC: 105 MMOL/L (ref 96–112)
CHOLEST SERPL-MCNC: 214 MG/DL (ref 100–199)
CO2 SERPL-SCNC: 21 MMOL/L (ref 20–33)
CREAT SERPL-MCNC: 1.08 MG/DL (ref 0.5–1.4)
FASTING STATUS PATIENT QL REPORTED: NORMAL
GFR SERPLBLD CREATININE-BSD FMLA CKD-EPI: 61 ML/MIN/1.73 M 2
GLOBULIN SER CALC-MCNC: 3.1 G/DL (ref 1.9–3.5)
GLUCOSE SERPL-MCNC: 110 MG/DL (ref 65–99)
HDLC SERPL-MCNC: 68 MG/DL
LDLC SERPL CALC-MCNC: 126 MG/DL
POTASSIUM SERPL-SCNC: 4.4 MMOL/L (ref 3.6–5.5)
PROT SERPL-MCNC: 7.4 G/DL (ref 6–8.2)
SODIUM SERPL-SCNC: 138 MMOL/L (ref 135–145)
TRIGL SERPL-MCNC: 101 MG/DL (ref 0–149)
TSH SERPL DL<=0.005 MIU/L-ACNC: 3.82 UIU/ML (ref 0.38–5.33)

## 2023-02-06 ENCOUNTER — PATIENT MESSAGE (OUTPATIENT)
Dept: MEDICAL GROUP | Facility: IMAGING CENTER | Age: 53
End: 2023-02-06
Payer: COMMERCIAL

## 2023-02-06 NOTE — PATIENT COMMUNICATION
Received request via: Patient    Was the patient seen in the last year in this department? Yes    Does the patient have an active prescription (recently filled or refills available) for medication(s) requested? No    Does the patient have jail Plus and need 100 day supply (blood pressure, diabetes and cholesterol meds only)? Patient does not have SCP    Pt also wondering if dosage should decrease or stay the same following lab work

## 2023-02-07 DIAGNOSIS — E03.9 HYPOTHYROIDISM, UNSPECIFIED TYPE: ICD-10-CM

## 2023-02-07 RX ORDER — LEVOTHYROXINE SODIUM 0.12 MG/1
125 TABLET ORAL
Qty: 30 TABLET | Refills: 0 | Status: SHIPPED | OUTPATIENT
Start: 2023-02-07 | End: 2023-02-07 | Stop reason: SDUPTHER

## 2023-02-07 RX ORDER — LEVOTHYROXINE SODIUM 0.12 MG/1
125 TABLET ORAL
Qty: 90 TABLET | Refills: 3 | Status: SHIPPED | OUTPATIENT
Start: 2023-02-07 | End: 2023-05-08

## 2023-03-16 DIAGNOSIS — F41.9 ANXIETY: ICD-10-CM

## 2023-03-16 DIAGNOSIS — F33.1 MODERATE EPISODE OF RECURRENT MAJOR DEPRESSIVE DISORDER (HCC): ICD-10-CM

## 2023-03-16 RX ORDER — FLUOXETINE HYDROCHLORIDE 40 MG/1
40 CAPSULE ORAL 2 TIMES DAILY
Qty: 180 CAPSULE | Refills: 3 | Status: SHIPPED | OUTPATIENT
Start: 2023-03-16 | End: 2023-06-14

## 2023-06-24 ENCOUNTER — APPOINTMENT (OUTPATIENT)
Dept: RADIOLOGY | Facility: MEDICAL CENTER | Age: 53
End: 2023-06-24
Attending: EMERGENCY MEDICINE
Payer: COMMERCIAL

## 2023-06-24 ENCOUNTER — HOSPITAL ENCOUNTER (EMERGENCY)
Facility: MEDICAL CENTER | Age: 53
End: 2023-06-24
Attending: EMERGENCY MEDICINE
Payer: COMMERCIAL

## 2023-06-24 VITALS
BODY MASS INDEX: 37.12 KG/M2 | WEIGHT: 259.26 LBS | SYSTOLIC BLOOD PRESSURE: 151 MMHG | RESPIRATION RATE: 18 BRPM | OXYGEN SATURATION: 97 % | DIASTOLIC BLOOD PRESSURE: 87 MMHG | TEMPERATURE: 98 F | HEART RATE: 96 BPM | HEIGHT: 70 IN

## 2023-06-24 DIAGNOSIS — S80.02XA CONTUSION OF LEFT KNEE, INITIAL ENCOUNTER: ICD-10-CM

## 2023-06-24 DIAGNOSIS — S49.91XA INJURY OF RIGHT SHOULDER, INITIAL ENCOUNTER: ICD-10-CM

## 2023-06-24 PROCEDURE — 99283 EMERGENCY DEPT VISIT LOW MDM: CPT

## 2023-06-24 PROCEDURE — 73030 X-RAY EXAM OF SHOULDER: CPT | Mod: RT

## 2023-06-24 PROCEDURE — 73562 X-RAY EXAM OF KNEE 3: CPT | Mod: LT

## 2023-06-24 RX ORDER — IBUPROFEN 600 MG/1
600 TABLET ORAL EVERY 6 HOURS PRN
Qty: 30 TABLET | Refills: 0 | Status: SHIPPED | OUTPATIENT
Start: 2023-06-24

## 2023-06-24 RX ORDER — IBUPROFEN 600 MG/1
600 TABLET ORAL EVERY 6 HOURS PRN
Qty: 30 TABLET | Refills: 0 | Status: SHIPPED | OUTPATIENT
Start: 2023-06-24 | End: 2023-06-24 | Stop reason: SDUPTHER

## 2023-06-24 ASSESSMENT — FIBROSIS 4 INDEX: FIB4 SCORE: 0.49

## 2023-06-24 NOTE — ED TRIAGE NOTES
Luz Zuñiga  53 y.o. female  Chief Complaint   Patient presents with    Shoulder Injury     Pt suffered a MGLF and injured her right shoulder.  Limited ROM     Knee Injury     Pt also injured her left knee       Pt amb to triage with steady gait for above complaint.   Pt is alert and oriented, speaking in full sentences, follows commands and responds appropriately to questions. Not in any apparent distress. Respirations are even and unlabored.  Pt placed in lobby. Pt educated on triage process. Pt encouraged to alert staff for any changes.

## 2023-06-24 NOTE — ED NOTES
Patient given discharge instructions and education. Verbalizes understanding. Given chance to ask questions. Patient educated on signs and symptoms to returned to ER, Educated patient they can return for any concerning symptoms. One paper prescription provided to patient. Education given to patient about medication. Patient states they have their belongings. Denies additional needs at this time. Reports pain is well controlled. Patient monitored every hour and PRN for safety and comfort. Patient ambulatory out of department.

## 2023-06-24 NOTE — LETTER
"  FORM C-4:  EMPLOYEE’S CLAIM FOR COMPENSATION/ REPORT OF INITIAL TREATMENT  EMPLOYEE’S CLAIM - PROVIDE ALL INFORMATION REQUESTED   First Name Luz Last Name Yogesh Birthdate 1970  Sex female Claim Number   Home Address [address]             [place] [address]                                   Age  53 y.o. Height  1.778 m (5' 10\") Weight  118 kg (259 lb 4.2 oz) [facility]     Mailing Address [address]              Zip [address] Telephone  [phone] (home) [phone] (work) Primary Language Spoken  English   Insurer   Third Party   EMPLOYERS INSURANCE Employee's Occupation (Job Title) When Injury or Occupational Disease Occurred     Employer's Name [employer] [place] [employer] [facility] [place] [phone]    Employer Address [address] [place] [address] [29] Zip [address]   Date of Injury  6/24/2023       Hour of Injury  10:30 AM Date Employer Notified  6/24/2023 Last Day of Work after Injury or Occupational Disease  6/24/2023 Supervisor to Whom Injury Reported  [facility]   Address or Location of Accident (if applicable) Work [1]   What were you doing at the time of accident? 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 Be specific and answer in detail. Use additional sheet if necessary)  I tripped and fell   If you believe that you have an occupational disease, when did you first have knowledge of the disability and it relationship to your employment? N/A Witnesses to the Accident  N/A   Nature of Injury or Occupational Disease  Workers' Compensation Part(s) of Body Injured or Affected  Knee (L), Shoulder (R), N/A    I CERTIFY THAT THE ABOVE IS TRUE AND CORRECT TO THE BEST OF MY KNOWLEDGE AND THAT I HAVE PROVIDED THIS INFORMATION IN ORDER TO OBTAIN THE BENEFITS OF [place] IN[place] OR CHAPTER " 617 [place]).  I HEREBY AUTHORIZE ANY PHYSICIAN, CHIROPRACTOR, SURGEON, PRACTITIONER, OR OTHER PERSON, ANY HOSPITAL, INCLUDING [facility] OR [facility], ANY MEDICAL SERVICE ORGANIZATION, ANY INSURANCE COMPANY, OR OTHER INSTITUTION OR ORGANIZATION TO RELEASE TO EACH OTHER, ANY MEDICAL OR OTHER INFORMATION, INCLUDING BENEFITS PAID OR PAYABLE, PERTINENT TO THIS INJURY OR DISEASE, EXCEPT INFORMATION RELATIVE TO DIAGNOSIS, TREATMENT AND/OR COUNSELING FOR AIDS, PSYCHOLOGICAL CONDITIONS, ALCOHOL OR CONTROLLED SUBSTANCES, FOR WHICH I MUST GIVE SPECIFIC AUTHORIZATION.  A PHOTOSTAT OF THIS AUTHORIZATION SHALL BE AS VALID AS THE ORIGINAL.  Date  06/24/2023    Place    [facility]                      Employee’s Signature   THIS REPORT MUST BE COMPLETED AND MAILED WITHIN 3 WORKING DAYS OF TREATMENT   Place [facility], EMERGENCY DEPT                       Name of Facility [facility]   Date  6/24/2023 Diagnosis  (S80.02XA) Contusion of left knee, initial encounter  (S49.91XA) Injury of right shoulder, initial encounter Is there evidence the injured employee was under the influence of alcohol and/or another controlled substance at the time of accident?   Hour  2:33 PM Description of Injury or Disease  Contusion of left knee, initial encounter  Injury of right shoulder, initial encounter No   Treatment  Presents for evaluation of shoulder and knee pain.  X-rays were obtained.  There is no signs of fractures.  At this point recommended range of motion exercises ice Tylenol and ibuprofen.  She is to follow-up with occupational health for recheck in 2 days.  Light duty until cleared by occupational health.  Have you advised the patient to remain off work five days or more?         No   X-Ray Findings  Negative If Yes   From Date    To Date      From information given by the employee, together with medical evidence, can you directly connect this injury or occupational disease as job  "incurred? Yes If No, is employee capable of: Full Duty  No Modified Duty  Yes   Is additional medical care by a physician indicated? Yes If Modified Duty, Specify any Limitations / Restrictions   No use of the right shoulder and no prolonged standing or running or lifting objects.   Do you know of any previous injury or disease contributing to this condition or occupational disease? No    Date 6/24/2023 Print Doctor’s Name Alfonso Muse I certify the employer’s copy of this form was mailed on:   Address [address] [place] [id number] [phone] INSURER’S USE ONLY   Provider’s Tax ID Number   Telephone Dept: [phone]    Doctor’s Signature e-ALFONSO Rose M.D. Degree  M.D.      Form C-4 (rev.10/07)                                                                         BRIEF DESCRIPTION OF RIGHTS AND BENEFITS  (Pursuant to [place] 616C.050)    Notice of Injury or Occupational Disease (Incident Report Form C-1): If an injury or occupational disease (OD) arises out of and in the course of employment, you must provide written notice to your employer as soon as practicable, but no later than 7 days after the accident or OD. Your employer shall maintain a sufficient supply of the required forms.    Claim for Compensation (Form C-4): If medical treatment is sought, the form C-4 is available at the place of initial treatment. A completed \"Claim for Compensation\" (Form C-4) must be filed within 90 days after an accident or OD. The treating physician or chiropractor must, within 3 working days after treatment, complete and mail to the employer, the employer's insurer and third-party , the Claim for Compensation.    Medical Treatment: If you require medical treatment for your on-the-job injury or OD, you may be required to select a physician or chiropractor from a list provided by your workers’ compensation insurer, if it has contracted with an Organization for Managed Care (MCO) or Preferred " Provider Organization (PPO) or providers of health care. If your employer has not entered into a contract with an MCO or PPO, you may select a physician or chiropractor from the Panel of Physicians and Chiropractors. Any medical costs related to your industrial injury or OD will be paid by your insurer.    Temporary Total Disability (TTD): If your doctor has certified that you are unable to work for a period of at least 5 consecutive days, or 5 cumulative days in a 20-day period, or places restrictions on you that your employer does not accommodate, you may be entitled to TTD compensation.    Temporary Partial Disability (TPD): If the wage you receive upon reemployment is less than the compensation for TTD to which you are entitled, the insurer may be required to pay you TPD compensation to make up the difference. TPD can only be paid for a maximum of 24 months.    Permanent Partial Disability (PPD): When your medical condition is stable and there is an indication of a PPD as a result of your injury or OD, within 30 days, your insurer must arrange for an evaluation by a rating physician or chiropractor to determine the degree of your PPD. The amount of your PPD award depends on the date of injury, the results of the PPD evaluation, your age and wage.    Permanent Total Disability (PTD): If you are medically certified by a treating physician or chiropractor as permanently and totally disabled and have been granted a PTD status by your insurer, you are entitled to receive monthly benefits not to exceed 66 2/3% of your average monthly wage. The amount of your PTD payments is subject to reduction if you previously received a lump-sum PPD award.    Vocational Rehabilitation Services: You may be eligible for vocational rehabilitation services if you are unable to return to the job due to a permanent physical impairment or permanent restrictions as a result of your injury or occupational disease.    Transportation and Per  Evens Reimbursement: You may be eligible for travel expenses and per evens associated with medical treatment.    Reopening: You may be able to reopen your claim if your condition worsens after claim closure.     Appeal Process: If you disagree with a written determination issued by the insurer or the insurer does not respond to your request, you may appeal to the Department of Administration, , by following the instructions contained in your determination letter. You must appeal the determination within 70 days from the date of the determination letter at [address], [address], [place], [place] [address], or [address], [place], [place] [address]. If you disagree with the  decision, you may appeal to the Department of Administration, . You must file your appeal within 30 days from the date of the  decision letter at [address], [address], [place], [place] [address], or [address], [place], [place] [address]. If you disagree with a decision of an , you may file a petition for judicial review with the District Court. You must do so within 30 days of the Appeal Officer’s decision. You may be represented by an  at your own expense or you may contact the [facility] for possible representation.    [place]  for Injured Workers (NAIW): If you disagree with a  decision, you may request that NAIW represent you without charge at an  Hearing. For information regarding denial of benefits, you may contact the [facility] at: [address], [address], [place] [address], [phone], or [address] [facility] [address] [facility] [address], [place] [address], [phone]    To File a Complaint with the Division: If you wish to file a complaint with the  of the Division of Industrial Relations (DIR),  please contact the Workers’ Compensation Section, [facility]  [address], [address], [place], [place] [address], telephone [phone], or [address], [address], [place], [place] [address], telephone [phone].    For assistance with Workers’ Compensation Issues: You may contact the [facility] Office for Consumer Health Assistance, [address], [address], [place], [place] [address], Toll Free 1-492.378.6755, Web site: http://[facility].[place].gov/Programs/SASHA E-mail: sasha@[facility].[place].gov  D-2 (rev. 10/20)              __________________________________________________________________                                    _06/24/2023_            Employee Name / Signature                                                                                                                            Date

## 2023-06-24 NOTE — ED PROVIDER NOTES
"ED Provider Note    CHIEF COMPLAINT  Chief Complaint   Patient presents with    Shoulder Injury     Pt suffered a MGLF and injured her right shoulder.  Limited ROM     Knee Injury     Pt also injured her left knee       EXTERNAL RECORDS REVIEWED  Patient was seen by her primary care physician December 9, 2022 for upper respiratory infection.  Patient also been seen at the [facility] in [facility] 22 r for ankle pain.    HPI/ROS  LIMITATION TO HISTORY     OUTSIDE HISTORIAN(S):  None    Luz Zuñiga is a 53 y.o. female who presents to the ED with complaints of right shoulder pain and left knee pain.  The patient was at work when she subsequently tripped and fell onto her left knee and landed with her out arms outstretched and started having some pain to her shoulder.  She states that she has a hard time raising her right shoulder and she has pain to her left knee but she is able to ambulate.    PAST MEDICAL HISTORY   has a past medical history of Anxiety, Depression, and Thyroid disease.    SURGICAL HISTORY   has a past surgical history that includes lumpectomy.    FAMILY HISTORY  Family History   Problem Relation Age of Onset    Cancer Mother     Heart Disease Father        SOCIAL HISTORY  Social History     Tobacco Use    Smoking status: Never    Smokeless tobacco: Never   Vaping Use    Vaping Use: Never used   Substance and Sexual Activity    Alcohol use: Yes    Drug use: Never    Sexual activity: Not on file       CURRENT MEDICATIONS  Home Medications    **Home medications have not yet been reviewed for this encounter**         ALLERGIES  No Known Allergies    PHYSICAL EXAM  VITAL SIGNS: BP (!) 161/102   Pulse (!) 102   Temp 36.4 °C (97.5 °F) (Temporal)   Resp 18   Ht 1.778 m (5' 10\")   Wt 118 kg (259 lb 4.2 oz)   SpO2 97%   BMI 37.20 kg/m²    Constitutional: Well developed, Well nourished , No acute distress, Non-toxic appearance.   HENT: Normocephalic, Atraumatic, Bilateral external ears " normal, oropharynx moist, No oral exudates, Nose normal.   Eyes: Pupils are equal round and react to light, extraocular motions are intact, conjunctiva is normal, there are no signs of exudate.   Neck: Non tender midline, trachea is midline  Thorax & Lungs: . Chest wall is nontender. Atraumatic.   Abdomen: Soft, nontender, nondistended. Bowel sounds are present. Atraumatic.   Skin: Warm, Dry, No erythema,   Back: No midline tenderness  Musculoskeletal: She is tender about her right shoulder with limited range of motion tender about the proximal humeral head region as well as in the AC joint.  Patient does have a positive Neer sign..  Patient does have an abrasion to her left knee.  She does have good extension and flexion of the knee.  ACL, PCL, MCL, LCL are grossly intact.    Neurologic: Alert & oriented x 3, Normal motor function, Normal sensory function, No focal deficits noted. GCS 15  Psychiatric: Affect normal, Judgment normal, Mood normal.         DIAGNOSTIC STUDIES / PROCEDURES    RADIOLOGY  I have independently interpreted the diagnostic imaging associated with this visit and am waiting the final reading from the radiologist.   My preliminary interpretation is as follows: No signs of acute fracture on either knee or shoulder x-ray  Radiologist interpretation:   DX-KNEE 3 VIEWS LEFT   Final Result         1. No acute osseous abnormality.      2. Soft tissue swelling about the patella tendon.      DX-SHOULDER 2+ RIGHT   Final Result         1. No acute osseous abnormality.            COURSE & MEDICAL DECISION MAKING    ED Observation Status? No; Patient does not meet criteria for ED Observation.     INITIAL ASSESSMENT, COURSE AND PLAN  Care Narrative: Patient presents for evaluation.  Clinically the patient is tender about the left needs a contusion to the left knee as well as the right shoulder.  I have very limited range of motion positive Neer sign.  Possible just a contusion versus fracture versus rotator  cuff tear.  X-ray was obtained there is no signs of fractures.  At this point I will recommend gentle range of motion exercises.  The patient is use ice Tylenol and ibuprofen for pain control follow-up with occupational health for further treatment and care as an outpatient return as needed.        ADDITIONAL PROBLEM LIST  None  DISPOSITION AND DISCUSSIONS    Decision tools and prescription drugs considered including, but not limited to: We will prescribe ibuprofen.    FINAL DIAGNOSIS  1. Contusion of left knee, initial encounter    2. Injury of right shoulder, initial encounter           The patient will return for new or worsening symptoms and is stable at the time of discharge.    The patient is referred to a primary physician for blood pressure management, diabetic screening, and for all other preventative health concerns.        DISPOSITION:  Patient will be discharged home in stable condition.    FOLLOW UP:  [address] [address] [phone]          OUTPATIENT MEDICATIONS:  Current Discharge Medication List        START taking these medications    Details   ibuprofen (MOTRIN) 600 MG Tab Take 1 Tablet by mouth every 6 hours as needed for Moderate Pain.  Qty: 30 Tablet, Refills: 0    Associated Diagnoses: Injury of right shoulder, initial encounter                       Electronically signed by: Alfonso Muse M.D., 6/24/2023 12:46 PM

## 2023-06-29 ENCOUNTER — OCCUPATIONAL MEDICINE (OUTPATIENT)
Dept: OCCUPATIONAL MEDICINE | Facility: CLINIC | Age: 53
End: 2023-06-29
Payer: COMMERCIAL

## 2023-06-29 VITALS — WEIGHT: 259 LBS | BODY MASS INDEX: 37.16 KG/M2

## 2023-06-29 DIAGNOSIS — S40.011D CONTUSION OF RIGHT SHOULDER, SUBSEQUENT ENCOUNTER: ICD-10-CM

## 2023-06-29 DIAGNOSIS — S80.02XD CONTUSION OF LEFT KNEE, SUBSEQUENT ENCOUNTER: ICD-10-CM

## 2023-06-29 DIAGNOSIS — M25.531 RIGHT WRIST PAIN: ICD-10-CM

## 2023-06-29 PROCEDURE — 99213 OFFICE O/P EST LOW 20 MIN: CPT | Performed by: NURSE PRACTITIONER

## 2023-06-29 ASSESSMENT — FIBROSIS 4 INDEX: FIB4 SCORE: 0.49

## 2023-06-29 NOTE — LETTER
[address],   [address] [place] [address]  Phone:  [phone] - Fax:  [phone]   [facility] Progress Report and Disability Certification  Date of Service: 6/29/2023   No Show:  No  Date / Time of Next Visit: 7/13/2023@8:30 AM   Claim Information   Patient Name: Luz Zuñiga  Claim Number:     Employer:    Date of Injury: 6/24/2023     Insurer / TPA: Employers Insurance  ID / SSN:     Occupation:   Diagnosis: Diagnoses of Contusion of left knee, subsequent encounter, Contusion of right shoulder, subsequent encounter, and Right wrist pain were pertinent to this visit.    Medical Information   Related to Industrial Injury? Yes    Subjective Complaints:  DOI 6/24/23: with complaints of right shoulder pain and left knee pain.  The patient was at work when she subsequently tripped and fell onto her left knee and landed with her out arms outstretched and started having some pain to her shoulder. Patient seen in ED x1 for these injuries. Today symptoms have been minimally improved.  She states that shoulder range of motion has not been improving. Any movement above waist high without significant pain.  She states that her wrist is now begun to hurt with certain movements mostly soreness.  The right knee is sore she notes an extensive history of arthritis in the joint so she is unclear if anything is worse than prior to the injury.  She does have some mild bruising and swelling to the joint but denies numbness, tingling, or weakness.  She has been taking ibuprofen as needed and applying ice.  She is using an arm sling.  She has been able to tolerate light duty with minimal difficulty.  MRI, physical therapy, and orthopedics referral placed at this visit.  Plan of care discussed with patient.   Objective Findings: Right shoulder: Discoloration noted.  Mild tenderness to the right shoulder joint with associated limited range of motion.  Patient  is mildly tender to the proximal humeral head region of the ACJ. Positive Neer sign, positive apprehension test, and positive Apley scratch test.  Popping appreciated.  Left knee: No gross deformity or discoloration noted.  Well-healing abrasion to her left knee, negative edema, erythema, foul discharge, or abnormal warmth the joint..  Full extension and flexion of the knee.  ACL, PCL, MCL, LCL are grossly intact.  No gait abnormalities noted.  Distal pulses 2+ intact.    Right wrist: No gross deformity or discoloration noted.  Mild soft tissue swelling noted to the dorsal aspect of the wrist otherwise no erythema, abnormal warmth, or open wounds.  Mild discomfort with rotation.  Negative discomfort with flexion or extension.  Brisk cap refill.  Radial pulses 2+ intact.   Pre-Existing Condition(s):     Assessment:   Condition Same    Status: Discharged / Care Transfer  Permanent Disability:No    Plan: PTDiagnosticsTransfer Care    Diagnostics: MRI    Comments:  Follow-up in 2 weeks, unless seen by orthopedics  Orthopedics referral placed, transfer care  Restricted duty, per orthopedics  MRI ordered  Recommend ibuprofen as prescribed, ice/heat application, arm sling and wrist brace as needed for comfort  Recommend continue with pendulum swing as tolerated  Recommend OTC topical ointment of your choosing  Physical therapy referral placed    Disability Information   Status: Released to Restricted Duty    From:  6/29/2023  Through: 7/13/2023 Restrictions are: Temporary   Physical Restrictions   Sitting:    Standing:    Stooping:    Bending:      Squatting:    Walking:    Climbing:    Pushing:      Pulling:    Other:    Reaching Above Shoulder (L):   Reaching Above Shoulder (R): 0 hrs/day     Reaching Below Shoulder (L):    Reaching Below Shoulder (R):  0 hrs/day   Not to exceed Weight Limits   Carrying(hrs): 0 Weight Limit(lb):   Lifting(hrs): 0 Weight  Limit(lb):     Comments:      Repetitive Actions   Hands: i.e. Fine  Manipulations from Grasping:     Feet: i.e. Operating Foot Controls:     Driving / Operate Machinery:     Health Care Provider’s Original or Electronic Signature  MARY ANN Rojas Health Care Provider’s Original or Electronic Signature    Hong Polanco DO MPH     Clinic Name / Location: [address],   [address]  [place] [address] Clinic Phone Number: Dept: [phone]   Appointment Time: 8:15 Am Visit Start Time: 8:16 AM   Check-In Time:  8:14 Am Visit Discharge Time:  10:30 AM   Original-Treating Physician or Chiropractor    Page 2-Insurer/TPA    Page 3-Employer    Page 4-Employee

## 2023-06-29 NOTE — PROGRESS NOTES
Subjective:     Luz Zuñiga is a 53 y.o. female who presents for Follow-Up ( DOI: 06/24/2023 Knee (L), Shoulder (R) - Better - RM 16/)      DOI 6/24/23: with complaints of right shoulder pain and left knee pain.  The patient was at work when she subsequently tripped and fell onto her left knee and landed with her out arms outstretched and started having some pain to her shoulder. Patient seen in ED x1 for these injuries. Today symptoms have been minimally improved.  She states that shoulder range of motion has not been improving. Any movement above waist high without significant pain.  She states that her wrist is now begun to hurt with certain movements mostly soreness.  The right knee is sore she notes an extensive history of arthritis in the joint so she is unclear if anything is worse than prior to the injury.  She does have some mild bruising and swelling to the joint but denies numbness, tingling, or weakness.  She has been taking ibuprofen as needed and applying ice.  She is using an arm sling.  She has been able to tolerate light duty with minimal difficulty.  MRI, physical therapy, and orthopedics referral placed at this visit.  Plan of care discussed with patient.    ROS: All systems were reviewed on intake form, form was reviewed and signed. See scanned documents in media. Pertinent positives and negatives included in HPI.    PMH: No pertinent past medical history to this problem  MEDS: Medications were reviewed in Epic  ALLERGIES: No Known Allergies  SOCHX: Works as  at [employer]   FH: No pertinent family history to this problem       Objective:     Wt 117 kg (259 lb)   BMI 37.16 kg/m²     [unfilled]    Right shoulder: Discoloration noted.  Mild tenderness to the right shoulder joint with associated limited range of motion.  Patient is mildly tender to the proximal humeral head region of the ACJ. Positive Neer sign, positive apprehension test, and positive Apley scratch test.  Popping  appreciated.  Left knee: No gross deformity or discoloration noted.  Well-healing abrasion to her left knee, negative edema, erythema, foul discharge, or abnormal warmth the joint..  Full extension and flexion of the knee.  ACL, PCL, MCL, LCL are grossly intact.  No gait abnormalities noted.  Distal pulses 2+ intact.    Right wrist: No gross deformity or discoloration noted.  Mild soft tissue swelling noted to the dorsal aspect of the wrist otherwise no erythema, abnormal warmth, or open wounds.  Mild discomfort with rotation.  Negative discomfort with flexion or extension.  Brisk cap refill.  Radial pulses 2+ intact.    Assessment/Plan:       1. Contusion of left knee, subsequent encounter  - MR-SHOULDER-W/O RIGHT; Future  - Referral to Radiology  - Referral to Physical Therapy  - Referral to Orthopedics    2. Contusion of right shoulder, subsequent encounter  - MR-SHOULDER-W/O RIGHT; Future  - Referral to Physical Therapy  - Referral to Orthopedics    3. Right wrist pain    Released to Restricted Duty FROM 6/29/2023 TO 7/13/2023     Follow-up in 2 weeks, unless seen by orthopedics  Orthopedics referral placed, transfer care  Restricted duty, per orthopedics  MRI ordered  Recommend ibuprofen as prescribed, ice/heat application, arm sling and wrist brace as needed for comfort  Recommend continue with pendulum swing as tolerated  Recommend OTC topical ointment of your choosing  Physical therapy referral placed    Differential diagnosis, natural history, supportive care, and indications for immediate follow-up discussed.    Approximately 25 minutes were spent in reviewing notes, preparing for visit, obtaining history, exam and evaluation, patient counseling/education and post visit documentation/orders.

## 2023-07-12 ENCOUNTER — TELEPHONE (OUTPATIENT)
Dept: OCCUPATIONAL MEDICINE | Facility: CLINIC | Age: 53
End: 2023-07-12
Payer: COMMERCIAL

## 2023-07-12 NOTE — TELEPHONE ENCOUNTER
Patient was seen @ [facility] 7/5/23; she is a JACKY and does not need to f/u with [facility] on 7/13/23.

## 2023-08-29 ENCOUNTER — HOSPITAL ENCOUNTER (OUTPATIENT)
Facility: MEDICAL CENTER | Age: 53
End: 2023-08-29
Payer: COMMERCIAL

## 2023-08-29 ENCOUNTER — OFFICE VISIT (OUTPATIENT)
Dept: MEDICAL GROUP | Facility: IMAGING CENTER | Age: 53
End: 2023-08-29
Payer: COMMERCIAL

## 2023-08-29 VITALS
RESPIRATION RATE: 18 BRPM | DIASTOLIC BLOOD PRESSURE: 90 MMHG | WEIGHT: 259.6 LBS | HEIGHT: 70 IN | HEART RATE: 104 BPM | TEMPERATURE: 97.2 F | OXYGEN SATURATION: 95 % | BODY MASS INDEX: 37.16 KG/M2 | SYSTOLIC BLOOD PRESSURE: 136 MMHG

## 2023-08-29 DIAGNOSIS — F41.9 ANXIETY: ICD-10-CM

## 2023-08-29 DIAGNOSIS — N92.6 IRREGULAR MENSES: ICD-10-CM

## 2023-08-29 DIAGNOSIS — F33.1 MODERATE EPISODE OF RECURRENT MAJOR DEPRESSIVE DISORDER (HCC): ICD-10-CM

## 2023-08-29 DIAGNOSIS — F13.282 SEDATIVE, HYPNOTIC OR ANXIOLYTIC DEPENDENCE W SLEEP DISORDER (HCC): ICD-10-CM

## 2023-08-29 PROCEDURE — 99214 OFFICE O/P EST MOD 30 MIN: CPT

## 2023-08-29 PROCEDURE — 3075F SYST BP GE 130 - 139MM HG: CPT

## 2023-08-29 PROCEDURE — 80307 DRUG TEST PRSMV CHEM ANLYZR: CPT

## 2023-08-29 PROCEDURE — 3080F DIAST BP >= 90 MM HG: CPT

## 2023-08-29 RX ORDER — FLUOXETINE HYDROCHLORIDE 40 MG/1
40 CAPSULE ORAL 2 TIMES DAILY
Qty: 180 CAPSULE | Refills: 1 | Status: SHIPPED | OUTPATIENT
Start: 2023-08-29 | End: 2024-05-14

## 2023-08-29 RX ORDER — LORAZEPAM 0.5 MG/1
0.5 TABLET ORAL NIGHTLY PRN
Qty: 30 TABLET | Refills: 0 | Status: SHIPPED | OUTPATIENT
Start: 2023-08-29 | End: 2023-10-06

## 2023-08-29 ASSESSMENT — PATIENT HEALTH QUESTIONNAIRE - PHQ9
5. POOR APPETITE OR OVEREATING: 2 - MORE THAN HALF THE DAYS
CLINICAL INTERPRETATION OF PHQ2 SCORE: 3
SUM OF ALL RESPONSES TO PHQ QUESTIONS 1-9: 11

## 2023-08-29 ASSESSMENT — ANXIETY QUESTIONNAIRES
1. FEELING NERVOUS, ANXIOUS, OR ON EDGE: MORE THAN HALF THE DAYS
4. TROUBLE RELAXING: MORE THAN HALF THE DAYS
6. BECOMING EASILY ANNOYED OR IRRITABLE: NEARLY EVERY DAY
5. BEING SO RESTLESS THAT IT IS HARD TO SIT STILL: MORE THAN HALF THE DAYS
7. FEELING AFRAID AS IF SOMETHING AWFUL MIGHT HAPPEN: MORE THAN HALF THE DAYS
2. NOT BEING ABLE TO STOP OR CONTROL WORRYING: MORE THAN HALF THE DAYS
3. WORRYING TOO MUCH ABOUT DIFFERENT THINGS: MORE THAN HALF THE DAYS
GAD7 TOTAL SCORE: 15

## 2023-08-29 ASSESSMENT — FIBROSIS 4 INDEX: FIB4 SCORE: 0.49

## 2023-08-29 NOTE — PROGRESS NOTES
Subjective:     CC:   Chief Complaint   Patient presents with    Depression     Physiatry was manger in [place]. She reported stop taking  it beginning of the year and now she needs it again         HPI:   Luz presents today to discuss:    Moderate episode of recurrent major depressive disorder (HCC)  Anxiety  Chronic condition with recurrent episode. She has been off Prozac due to her insurance changing and pharmacy was not able to refill med. She made lifestyle modification to manage depression and anxiety which was effective.  However, due to increased work-related stress and going through divorce,  her symptoms flared up these past few weeks. Her symptoms are starting to impact her daily living.  Denies intent to harm self of others. NO recent mental health hospitalizations.    Irregular menstrual cycle  Ongoing for the past 6-9 months ago. She is concerned that she is going through perimenopausal. Hormonal fluctuations are provoking her depression and anxiety in addition to work related and personal stress.  She would like referral to ob/gyn.        8/29/2023     3:27 PM 12/5/2022     8:25 AM    KAMLESH-7 ANXIETY SCALE FLOWSHEET   Feeling nervous, anxious, or on edge 2 2   Not being able to stop or control worrying 2 1   Worrying too much about different things 2 2   Trouble relaxing 2 2   Being so restless that it is hard to sit still 2 1   Becoming easily annoyed or irritable 3 1   Feeling afraid as if something awful might happen 2 2   KAMLESH-7 Total Score 15 11       Interpretation of KAMLESH-7 Total Score   Score Severity   0-4 Minimal Anxiety  5-9 Mild Anxiety   10-14 Moderate Anxiety  15-21 Severy Anxiety          8/29/2023     3:20 PM 12/5/2022     8:20 AM   PHQ-9 Screening   Little interest or pleasure in doing things 1 - several days 2 - more than half the days   Feeling down, depressed, or hopeless 2 - more than half the days 1 - several days   Trouble falling or staying asleep, or sleeping too much 3 -  nearly every day 2 - more than half the days   Feeling tired or having little energy 1 - several days 2 - more than half the days   Poor appetite or overeating 2 - more than half the days 1 - several days   Feeling bad about yourself - or that you are a failure or have let yourself or your family down 1 - several days 1 - several days   Trouble concentrating on things, such as reading the newspaper or watching television 1 - several days 1 - several days   Moving or speaking so slowly that other people could have noticed. Or the opposite - being so fidgety or restless that you have been moving around a lot more than usual 0 - not at all 0 - not at all   Thoughts that you would be better off dead, or of hurting yourself in some way 0 - not at all 0 - not at all   PHQ-2 Total Score 3 3   PHQ-9 Total Score 11 10       Interpretation of PHQ-9 Total Score   Score Severity   1-4 No Depression   5-9 Mild Depression   10-14 Moderate Depression   15-19 Moderately Severe Depression   20-27 Severe Depression      Past Medical History:   Diagnosis Date    Anxiety     Depression     Thyroid disease      Family History   Problem Relation Age of Onset    Cancer Mother     Heart Disease Father      Past Surgical History:   Procedure Laterality Date    LUMPECTOMY       Social History     Tobacco Use    Smoking status: Never    Smokeless tobacco: Never   Vaping Use    Vaping Use: Never used   Substance Use Topics    Alcohol use: Yes    Drug use: Never     Social History     Social History Narrative    Not on file     Current Outpatient Medications Ordered in Epic   Medication Sig Dispense Refill    fluoxetine (PROZAC) 40 MG capsule Take 1 Capsule by mouth 2 times a day for 180 days. 180 Capsule 1    LORazepam (ATIVAN) 0.5 MG Tab Take 1 Tablet by mouth at bedtime as needed for Anxiety for up to 30 days. Indications: Feeling Anxious 30 Tablet 0    ibuprofen (MOTRIN) 600 MG Tab Take 1 Tablet by mouth every 6 hours as needed for Moderate  "Pain. 30 Tablet 0    metronidazole (METROGEL) 0.75 % gel Apply 1 Application topically 2 times a day. 45 g 1    traZODone (DESYREL) 50 MG Tab trazodone 50 mg tablet (Patient not taking: Reported on 8/29/2023)      albuterol 108 (90 Base) MCG/ACT Aero Soln inhalation aerosol Inhale 2 Puffs every four hours as needed for Shortness of Breath. 1 Each 3    benzonatate (TESSALON) 100 MG Cap Take 1 Capsule by mouth 3 times a day as needed for Cough. 60 Capsule 0    LORazepam (ATIVAN) 1 MG Tab Take 1 mg by mouth every four hours as needed for Anxiety. (Patient not taking: Reported on 8/29/2023)      ARIPiprazole (ABILIFY) 5 MG tablet Take 5 mg by mouth every day. (Patient not taking: Reported on 12/5/2022)      diphenhydramine (SOMINEX) 25 MG tablet Take 25 mg by mouth.       No current Epic-ordered facility-administered medications on file.     Patient has no known allergies.    ROS: see hpi  Gen: no fevers/chills  Pulm: no sob, no cough  CV: no chest pain, no palpitations, no edema  GI: no nausea/vomiting, no diarrhea  Skin: no rash    Objective:   Exam:  BP (!) 136/90 (BP Location: Left arm, Patient Position: Sitting, BP Cuff Size: Adult)   Pulse (!) 104   Temp 36.2 °C (97.2 °F) (Temporal)   Resp 18   Ht 1.778 m (5' 10\")   Wt 118 kg (259 lb 9.6 oz)   LMP 08/29/2023   SpO2 95%   BMI 37.25 kg/m²    Body mass index is 37.25 kg/m².    Gen: Alert and oriented, No apparent distress.  HEENT: Head atraumatic, normocephalic. Pupils equal and round.  Neck: Neck is supple without lymphadenopathy.   Lungs: Normal effort, CTA bilaterally, no wheezes, rhonchi, or rales  CV: Regular rate and rhythm. No murmurs, rubs, or gallops.  ABD: +BS. Non-tender, non-distended. No rebound, rigidity, or guarding.  Ext: No clubbing, cyanosis, edema.    Assessment & Plan:     53 y.o. female with the following -     1. Moderate episode of recurrent major depressive disorder (HCC)  2. Anxiety  3. 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  Will restart Prozac.  Medication side effects discussed.  Advised that effects of medication may take from 4 to 8 weeks to manifest.  I will prescribe a short course of benzodiazapine to be taken only for severe symptoms. Risks, benefits and alternatives to these medications reviewed with patient.  Please read package insert prior to starting any new medications.  Advised to avoid alcoholic beverages or other controlled substances when taking benzodiazapine . Risk of tolerance, dependence and withdrawal reviewed.  Discussed treatment options for anxiety including pharmacotherapy, counseling and lifestyle interventions (sleep hygiene, reducing caffeine intake, meditation, breathing exercises, physical exercise).   Referred for behavior counseling.  Obtained and reviewed patient utilization report from [facility] pharmacy database on 8/30/2023 8:03 AM  prior to writing prescription for controlled substance II, III or IV per [place] bill . Based on assessment of the report, the prescription is medically necessary.   Controlled substance discussed with client. Client agrees to abide by controlled substance contract.  Advised to seek immediate medical attention for following symptoms:   -may increase risk of suicidal thinking or behavior during initial treatment  -anxiety, agitation, confusion, delirium, hyperreflexia, muscle rigidity, myoclonus, tachycardia, tachypnea, and tremor.    - Referral to Behavioral Health  - fluoxetine (PROZAC) 40 MG capsule; Take 1 Capsule by mouth 2 times a day for 180 days.  Dispense: 180 Capsule; Refill: 1  - LORazepam (ATIVAN) 0.5 MG Tab; Take 1 Tablet by mouth at bedtime as needed for Anxiety for up to 30 days. Indications: Feeling Anxious  Dispense: 30 Tablet; Refill: 0  - Controlled Substance Treatment Agreement  - URINE DRUG SCREEN; Future    4. Irregular menses  Chronic and ongoing condition. Likely going through  perimenopausal to menopausal period. Will refer to ob/gyn.    - Referral to OB/Gyn    HCC Gap Form    Comorbidity Diagnosis: Symptomatic PVCs  Assessment and plan: Chronic, worsening. Encouraged healthy diet and physical activity changes with a goal of weight loss. Follow up at least annually. The comorbid condition is persistent  based on today's assessment. Continue current treatment plan including control of risk factors. Follow up at least yearly.  Diagnosis to address: F33.1 - Moderate episode of recurrent major depressive disorder (HCC)  Assessment and plan: Chronic, exacerbated. Treatment and follow up: will restart prozac, lorazepam prn, and referral to behavioral health.   Last edited 08/30/23 07:54 PDT by MARY ANN Mckay           Medical Decision Making/Course:  In the course of preparing for this visit with review of the pertinent past medical history, recent and past clinic visits, current medications, and performing chart, immunization, medical history and medication reconciliation, and in the further course of obtaining the current history pertinent to the clinic visit today, performing an exam and evaluation, ordering and independently evaluating labs, tests, and/or procedures, prescribing any recommended new medications as noted above, providing any pertinent counseling and education and recommending further coordination of care. This was discussed with patient in a shared-decision making conversation, and they understand and agreed with plan of care.     Return in about 6 weeks (around 10/10/2023), or if symptoms worsen or fail to improve, for med check.    TIFFANIE Mckay.   [facility]    Please note that this dictation was created using voice recognition software. I have made every reasonable attempt to correct obvious errors, but I expect that there are errors of grammar and possibly content that I did not discover before finalizing the note.

## 2023-08-29 NOTE — LETTER
[facility]  ERROL MckayP.R.N.  [address] [place] [address]  Fax: [phone]   Authorization for Release/Disclosure of   Protected Health Information   Name: VERONIKA WISDOM : 1970 SSN: [id number]   Address: [address] [place] [address] [place] [address] Phone:    [phone] (home) [phone] (work)   I authorize the entity listed below to release/disclose the PHI below to:   [facility]/Katja Lopes A.P.R.N. and CEDRIC Mckay.P.R.HUONG.   Provider or Entity Name:     Address   City, State, Zip   Phone:      Fax:     Reason for request: continuity of care   Information to be released:    [  ] LAST COLONOSCOPY,  including any PATH REPORT and follow-up  [  ] LAST FIT/COLOGUARD RESULT [  ] LAST DEXA  [  ] LAST MAMMOGRAM  [  ] LAST PAP  [  ] LAST LABS [  ] RETINA EXAM REPORT  [  ] IMMUNIZATION RECORDS  [  ] Release all info      [  ] Check here and initial the line next to each item to release ALL health information INCLUDING  _____ Care and treatment for drug and / or alcohol abuse  _____ HIV testing, infection status, or AIDS  _____ Genetic Testing    DATES OF SERVICE OR TIME PERIOD TO BE DISCLOSED: _____________  I understand and acknowledge that:  * This Authorization may be revoked at any time by you in writing, except if your health information has already been used or disclosed.  * Your health information that will be used or disclosed as a result of you signing this authorization could be re-disclosed by the recipient. If this occurs, your re-disclosed health information may no longer be protected by State or Federal laws.  * You may refuse to sign this Authorization. Your refusal will not affect your ability to obtain treatment.  * This Authorization becomes effective upon signing and will  on (date) __________.      If no date is indicated, this Authorization will  one (1) year from the signature date.    Name: Veronika Wisdom  Signature: Date:   2023     PLEASE FAX  REQUESTED RECORDS BACK TO: [phone]

## 2023-08-29 NOTE — LETTER
[facility]  ERROL MckayP.R.N.  [address] [place] [address]  Fax: [phone]   Authorization for Release/Disclosure of   Protected Health Information   Name: VERONIKA WISDOM : 1970 SSN: [id number]   Address: [address] [place] [address] [place] [address] Phone:    [phone] (home) [phone] (work)   I authorize the entity listed below to release/disclose the PHI below to:   [facility]/Katja Lopes A.P.R.N. and CEDRIC Mckay.P.R.HUONG.   Provider or Entity Name:     Address   City, State, Zip   Phone:      Fax:     Reason for request: continuity of care   Information to be released:    [  ] LAST COLONOSCOPY,  including any PATH REPORT and follow-up  [  ] LAST FIT/COLOGUARD RESULT [  ] LAST DEXA  [  ] LAST MAMMOGRAM  [  ] LAST PAP  [  ] LAST LABS [  ] RETINA EXAM REPORT  [  ] IMMUNIZATION RECORDS  [  ] Release all info      [  ] Check here and initial the line next to each item to release ALL health information INCLUDING  _____ Care and treatment for drug and / or alcohol abuse  _____ HIV testing, infection status, or AIDS  _____ Genetic Testing    DATES OF SERVICE OR TIME PERIOD TO BE DISCLOSED: _____________  I understand and acknowledge that:  * This Authorization may be revoked at any time by you in writing, except if your health information has already been used or disclosed.  * Your health information that will be used or disclosed as a result of you signing this authorization could be re-disclosed by the recipient. If this occurs, your re-disclosed health information may no longer be protected by State or Federal laws.  * You may refuse to sign this Authorization. Your refusal will not affect your ability to obtain treatment.  * This Authorization becomes effective upon signing and will  on (date) __________.      If no date is indicated, this Authorization will  one (1) year from the signature date.    Name: Veronika Wisdom  Signature: Date:   2023     PLEASE FAX  REQUESTED RECORDS BACK TO: [phone]

## 2023-08-31 LAB
AMPHET CTO UR CFM-MCNC: NEGATIVE NG/ML
BARBITURATES CTO UR CFM-MCNC: NEGATIVE NG/ML
BENZODIAZ CTO UR CFM-MCNC: NEGATIVE NG/ML
CANNABINOIDS CTO UR CFM-MCNC: NEGATIVE NG/ML
COCAINE CTO UR CFM-MCNC: NEGATIVE NG/ML
DRUG COMMENT 753798: NORMAL
METHADONE CTO UR CFM-MCNC: NEGATIVE NG/ML
OPIATES CTO UR CFM-MCNC: NEGATIVE NG/ML
PCP CTO UR CFM-MCNC: NEGATIVE NG/ML
PROPOXYPH CTO UR CFM-MCNC: NEGATIVE NG/ML

## 2023-09-05 PROCEDURE — RXMED WILLOW AMBULATORY MEDICATION CHARGE

## 2023-09-06 ENCOUNTER — PHARMACY VISIT (OUTPATIENT)
Dept: PHARMACY | Facility: MEDICAL CENTER | Age: 53
End: 2023-09-06
Payer: COMMERCIAL

## 2024-01-09 ENCOUNTER — OFFICE VISIT (OUTPATIENT)
Dept: MEDICAL GROUP | Facility: IMAGING CENTER | Age: 54
End: 2024-01-09
Payer: COMMERCIAL

## 2024-01-09 VITALS
RESPIRATION RATE: 14 BRPM | WEIGHT: 260 LBS | DIASTOLIC BLOOD PRESSURE: 98 MMHG | HEART RATE: 88 BPM | TEMPERATURE: 96.7 F | HEIGHT: 70 IN | SYSTOLIC BLOOD PRESSURE: 130 MMHG | OXYGEN SATURATION: 97 % | BODY MASS INDEX: 37.22 KG/M2

## 2024-01-09 DIAGNOSIS — U07.1 COVID-19 VIRUS INFECTION: ICD-10-CM

## 2024-01-09 DIAGNOSIS — Z02.89 ENCOUNTER TO OBTAIN EXCUSE FROM WORK: ICD-10-CM

## 2024-01-09 DIAGNOSIS — J40 BRONCHITIS: ICD-10-CM

## 2024-01-09 PROCEDURE — 3075F SYST BP GE 130 - 139MM HG: CPT

## 2024-01-09 PROCEDURE — 3080F DIAST BP >= 90 MM HG: CPT

## 2024-01-09 PROCEDURE — 99213 OFFICE O/P EST LOW 20 MIN: CPT

## 2024-01-09 RX ORDER — BENZONATATE 100 MG/1
100 CAPSULE ORAL 3 TIMES DAILY PRN
Qty: 60 CAPSULE | Refills: 0 | Status: SHIPPED | OUTPATIENT
Start: 2024-01-09

## 2024-01-09 RX ORDER — BENZONATATE 100 MG/1
100 CAPSULE ORAL 3 TIMES DAILY PRN
Qty: 60 CAPSULE | Refills: 0 | Status: SHIPPED | OUTPATIENT
Start: 2024-01-09 | End: 2024-01-09

## 2024-01-09 ASSESSMENT — FIBROSIS 4 INDEX: FIB4 SCORE: 0.49

## 2024-01-09 ASSESSMENT — PATIENT HEALTH QUESTIONNAIRE - PHQ9: CLINICAL INTERPRETATION OF PHQ2 SCORE: 0

## 2024-01-09 NOTE — PROGRESS NOTES
Subjective:     CC:   Chief Complaint   Patient presents with    Other     Pt report congestion and fatigue , coughing at night and when she move her head the room is spinning   Pt took Covid test  positive last Wednesday, Friday  and Sunday home test         HPI:   Luz presents today to discuss:    Covid-19 infection  Patient reports developing symptoms of nasal congestion, fatigue, productive cough, and vertigo on Wednesday.  She tested positive for Covid on Wednesday, Friday, and Sunday.  Last fever was Sat night and have been afebrile since.   Overall, her symptoms fever and body aches are better. However, fatigue, chest congestion and cough are worsening.   She is concern about going back to work and spreading Covid and making her condition worse.   Denies fevers, chills, lethargy, fatigue, malaise, facial swelling, visual changes, weakness, elevated heart rate, stiff neck, prolonged cough, persistent wheezing, leg swelling, trouble breathing, persistent pain or pressure in the chest, confusion, inability to wake or stay awake, bluish lips or face, persistent tachycardia (fast heart rate), prolonged dizziness, or fainting.      Past Medical History:   Diagnosis Date    Anxiety     Depression     Thyroid disease      Family History   Problem Relation Age of Onset    Cancer Mother     Heart Disease Father      Past Surgical History:   Procedure Laterality Date    LUMPECTOMY       Social History     Tobacco Use    Smoking status: Never    Smokeless tobacco: Never   Vaping Use    Vaping Use: Never used   Substance Use Topics    Alcohol use: Yes    Drug use: Never     Social History     Social History Narrative    Not on file     Current Outpatient Medications Ordered in Epic   Medication Sig Dispense Refill    benzonatate (TESSALON) 100 MG Cap Take 1 Capsule by mouth 3 times a day as needed for Cough. 60 Capsule 0    fluoxetine (PROZAC) 40 MG capsule Take 1 Capsule by mouth 2 times a day for 180 days. 180 Capsule  "1    ibuprofen (MOTRIN) 600 MG Tab Take 1 Tablet by mouth every 6 hours as needed for Moderate Pain. 30 Tablet 0    metronidazole (METROGEL) 0.75 % gel Apply 1 Application topically 2 times a day. 45 g 1    traZODone (DESYREL) 50 MG Tab trazodone 50 mg tablet (Patient not taking: Reported on 8/29/2023)      albuterol 108 (90 Base) MCG/ACT Aero Soln inhalation aerosol Inhale 2 Puffs every four hours as needed for Shortness of Breath. 1 Each 3    LORazepam (ATIVAN) 1 MG Tab Take 1 mg by mouth every four hours as needed for Anxiety. (Patient not taking: Reported on 8/29/2023)      ARIPiprazole (ABILIFY) 5 MG tablet Take 5 mg by mouth every day. (Patient not taking: Reported on 12/5/2022)      diphenhydramine (SOMINEX) 25 MG tablet Take 25 mg by mouth.       No current Epic-ordered facility-administered medications on file.     Patient has no known allergies.    ROS: see hpi  Gen: no fevers/chills  Pulm: no sob, no cough  CV: no chest pain, no palpitations, no edema  GI: no nausea/vomiting, no diarrhea  Skin: no rash    Objective:   Exam:  BP (!) 130/98 (BP Location: Left arm, Patient Position: Sitting, BP Cuff Size: Adult)   Pulse 88   Temp 35.9 °C (96.7 °F) (Temporal)   Resp 14   Ht 1.778 m (5' 10\")   Wt 118 kg (260 lb)   LMP 11/28/2023   SpO2 97%   BMI 37.31 kg/m²    Body mass index is 37.31 kg/m².    Gen: Alert and oriented, No apparent distress.  HEENT: Head atraumatic, normocephalic. Pupils equal and round.  Neck: Neck is supple without lymphadenopathy.   Lungs: Normal effort, CTA bilaterally, no wheezes, rhonchi, or rales  CV: Regular rate and rhythm. No murmurs, rubs, or gallops.  ABD: +BS. Non-tender, non-distended. No rebound, rigidity, or guarding.  Ext: No clubbing, cyanosis, edema.    Assessment & Plan:     53 y.o. female with the following -     1. COVID-19 virus infection  2. Bronchitis  Chronic, stable condition. VSS. No s/s of oxygen or respiratory distress.  No s/s of bacterial infection. "   Discussed CDC isolation guidelines and recommendation.  My recommendations for an upper respiratory infection:  - increase fluid intake, plenty of rest, and frequent hand washing  - Use a humidifier, especially at night. Cold or warm water humidifiers have the same effect.  - Tai Med squeeze bottle sinus rinses or plain nasal saline twice a day.  - Hot tea + honey + fresh lemon juice  - Honey by itself has been shown to help provide cough relief  - use warm salt water gargles, over-the-counter Cepacol throat lozenges for sore throat  - take OTC immune booster supplementation that contains Vitamin C, D, E, zinc, B vitamins.  - take Mucinex as needed during the day and use throat lozenges such as Ricola. Use dextromethorphan for cough only at night to allow the body to expel the pathogen during the day.  Take 5-10 deep breaths intermittently throughout the day and move the body as tolerated to aid in respiration.  -Take tylenol or Ibuprofen as needed for fever and pain.   Instructed to go to ED for worsening symptoms, persistent fevers, facial swelling, visual changes, weakness, elevated heart rate, stiff neck, prolonged cough, persistent wheezing, leg swelling, or any other concerns.     - benzonatate (TESSALON) 100 MG Cap; Take 1 Capsule by mouth 3 times a day as needed for Cough.  Dispense: 60 Capsule; Refill: 0    3. Encounter to obtain excuse from work  Excuse from work note provided to patient.     Medical Decision Making/Course:  In the course of preparing for this visit with review of the pertinent past medical history, recent and past clinic visits, current medications, and performing chart, immunization, medical history and medication reconciliation, and in the further course of obtaining the current history pertinent to the clinic visit today, performing an exam and evaluation, ordering and independently evaluating labs, tests, and/or procedures, prescribing any recommended new medications as noted  above, providing any pertinent counseling and education and recommending further coordination of care. This was discussed with patient in a shared-decision making conversation, and they understand and agreed with plan of care.     Return if symptoms worsen or fail to improve.    TIFFANIE Mckay.   [facility]    Please note that this dictation was created using voice recognition software. I have made every reasonable attempt to correct obvious errors, but I expect that there are errors of grammar and possibly content that I did not discover before finalizing the note.

## 2024-01-09 NOTE — LETTER
[facility]  [place] [id number] [place] [place] [address]     January 9, 2024    Patient: Luz Zuñiga   YOB: 1970   Date of Visit: 1/9/2024       To Whom It May Concern:    Luz Zuñiga was seen and treated in our department on 1/9/2024. Due to Luz's current illness, it is recommended that she be excused from work 1/9-1/12 to ensure recovery and prevent complications and potential bodily harm. She may return to work on 1/15.    Sincerely,   MARY ANN Mckay   Electronically signed

## 2024-01-09 NOTE — LETTER
[facility]  [place] [id number] [place] [address]     January 9, 2024    Patient: Luz Zuñiga   YOB: 1970   Date of Visit: 1/9/2024       To Whom It May Concern:    Luz Zuñiga was seen and treated in our department on 1/9/2024. Due to Luz's current illness, it is recommended that she be excused from work 1/9-1/12 to ensure recovery and prevent complications and potential bodily harm. She may return to work on 1/15.    Sincerely,     TIFFANIE Mckay.

## 2024-01-23 ENCOUNTER — TELEMEDICINE (OUTPATIENT)
Dept: MEDICAL GROUP | Facility: IMAGING CENTER | Age: 54
End: 2024-01-23
Payer: COMMERCIAL

## 2024-01-23 VITALS — WEIGHT: 260 LBS | BODY MASS INDEX: 37.22 KG/M2 | HEIGHT: 70 IN

## 2024-01-23 DIAGNOSIS — J01.90 ACUTE BACTERIAL RHINOSINUSITIS: ICD-10-CM

## 2024-01-23 DIAGNOSIS — B96.89 ACUTE BACTERIAL RHINOSINUSITIS: ICD-10-CM

## 2024-01-23 PROCEDURE — 99213 OFFICE O/P EST LOW 20 MIN: CPT | Mod: 95

## 2024-01-23 RX ORDER — LORAZEPAM 1 MG/1
1 TABLET ORAL EVERY 4 HOURS PRN
Qty: 30 TABLET | Refills: 0 | Status: CANCELLED | OUTPATIENT
Start: 2024-01-23 | End: 2024-02-22

## 2024-01-23 RX ORDER — AMOXICILLIN AND CLAVULANATE POTASSIUM 875; 125 MG/1; MG/1
1 TABLET, FILM COATED ORAL 2 TIMES DAILY
Qty: 10 TABLET | Refills: 0 | Status: SHIPPED | OUTPATIENT
Start: 2024-01-23

## 2024-01-23 ASSESSMENT — FIBROSIS 4 INDEX: FIB4 SCORE: 0.49

## 2024-01-23 NOTE — PROGRESS NOTES
Virtual Visit: Established Patient   This visit was conducted via Zoom using secure and encrypted videoconferencing technology.   The patient was in their home in the [facility].    The patient's identity was confirmed and verbal consent was obtained for this virtual visit.    Subjective:   CC:   Chief Complaint   Patient presents with    Other     Ongoing sinus pain and ear pain. Sounds crackly and it is painful     Luz Zuñiga is a 53 y.o. female presenting for evaluation and management of:    Sinusitis  Ongoing and worsening condition since her covid infection on beginning of this month. The rest of her Uri symptoms have resolved except sinus pressure, congestion, headache, fatigue, and ear pain. Sinus symptoms are worsening.   She denies periorbital edema/swelling, vision changes, stick neck, severe headache, altered mental status, facial numbness/tingling/weakness     ROS: per hpi    Gen: no fevers/chills  Pulm: no sob, no cough  CV: no chest pain, no palpitations, no edema  GI: no nausea/vomiting, no diarrhea  Skin: no rash      Current medicines (including changes today)  Current Outpatient Medications   Medication Sig Dispense Refill    amoxicillin-clavulanate (AUGMENTIN) 875-125 MG Tab Take 1 Tablet by mouth 2 times a day. 10 Tablet 0    benzonatate (TESSALON) 100 MG Cap Take 1 Capsule by mouth 3 times a day as needed for Cough. 60 Capsule 0    fluoxetine (PROZAC) 40 MG capsule Take 1 Capsule by mouth 2 times a day for 180 days. 180 Capsule 1    ibuprofen (MOTRIN) 600 MG Tab Take 1 Tablet by mouth every 6 hours as needed for Moderate Pain. 30 Tablet 0    metronidazole (METROGEL) 0.75 % gel Apply 1 Application topically 2 times a day. 45 g 1    LORazepam (ATIVAN) 1 MG Tab Take 1 mg by mouth every four hours as needed for Anxiety.      traZODone (DESYREL) 50 MG Tab trazodone 50 mg tablet (Patient not taking: Reported on 8/29/2023)      albuterol 108 (90 Base) MCG/ACT Aero Soln inhalation aerosol Inhale  "2 Puffs every four hours as needed for Shortness of Breath. 1 Each 3    ARIPiprazole (ABILIFY) 5 MG tablet Take 5 mg by mouth every day. (Patient not taking: Reported on 12/5/2022)      diphenhydramine (SOMINEX) 25 MG tablet Take 25 mg by mouth.       No current facility-administered medications for this visit.       Patient Active Problem List    Diagnosis Date Noted    Migraine 01/12/2021    Pharyngeal dysphagia 12/27/2018    Throat discomfort 12/27/2018    Sinusitis 12/06/2018    Anxiety 05/15/2018    Heartburn 05/15/2018    Polycystic ovary syndrome 05/15/2018    PCOS (polycystic ovarian syndrome) 05/19/2017    Neck pain 03/27/2017    Elbow pain, left 02/08/2017    Symptomatic PVCs 07/07/2015    Nystagmus associated with disorders of the vestibular system 11/30/2011    Papanicolaou smear of cervix with atypical squamous cells of undetermined significance (ASC-US) 12/24/2009    Premenstrual tension syndrome 11/11/2009    Trochanteric bursitis of right hip 09/21/2007    Gastroesophageal reflux disease 02/27/2007    Hypothyroidism 02/27/2007    Obesity 01/28/2004    Episode of recurrent major depressive disorder (HCC) 08/09/2000    Intracranial injury (HCC) 08/24/1999        Objective:   Ht 1.778 m (5' 10\") Comment: pt reported  Wt 118 kg (260 lb) Comment: pt reported  LMP 11/28/2023 (Approximate)   BMI 37.31 kg/m²     Physical Exam:  Constitutional: Alert, no distress, well-groomed.  Skin: No rashes in visible areas.  Eye: Round. Conjunctiva clear, lids normal. No icterus.   ENMT: Lips pink without lesions, good dentition, moist mucous membranes. Phonation normal. Tender frontal and bilateral maxillary sinuses.   Neck: No masses, no thyromegaly. Moves freely without pain.  Respiratory: Unlabored respiratory effort, no cough or audible wheeze  Psych: Alert and oriented x3, normal affect and mood.     Assessment and Plan:   The following treatment plan was discussed:   1. Acute bacterial rhinosinusitis  " amoxicillin-clavulanate (AUGMENTIN) 875-125 MG Tab      Onset of symptoms started over 2 weeks ago. Will treat with Augmentin 875/125 mg PO BID for 5 days. Med administration and side effects discussed. Pt agreeable to this.     Recommend:  -mist humidifier at home  -use nasal saline wash (i.e. Nedi-Pot)  -use warm compress over face to help alleviate congestion  -may take OTC decongestant such as Oxymetazoline (Afrin) 0.05% spray; DO NOT USE LONGER THAN 3 DAYS, CAN CAUSE REBOUND CONGESTION  -may use nasal corticosteroid spray such as Flonase, 2 sprays each nostril daily  -may consider OTC anti-histamine such as Zyrtec or Claritin for allergy symptoms    Please contact office or return to clinic if no improvement within 48-72h on antibiotics, or for any other questions or concerns.     Medical Decision Making/Course:  In the course of preparing for this visit with review of the pertinent past medical history, recent and past clinic visits, current medications, and performing chart, immunization, medical history and medication reconciliation, and in the further course of obtaining the current history pertinent to the clinic visit today, performing an exam and evaluation, ordering and independently evaluating labs, tests, and/or procedures, prescribing any recommended new medications as noted above, providing any pertinent counseling and education and recommending further coordination of care. This was discussed with patient in a shared-decision making conversation, and they understand and agreed with plan of care.     Follow-up: Return if symptoms worsen or fail to improve.    Thank you, Katja MADERA  [facility]

## 2024-02-12 PROCEDURE — RXMED WILLOW AMBULATORY MEDICATION CHARGE

## 2024-02-14 ENCOUNTER — PHARMACY VISIT (OUTPATIENT)
Dept: PHARMACY | Facility: MEDICAL CENTER | Age: 54
End: 2024-02-14
Payer: COMMERCIAL

## 2024-05-22 DIAGNOSIS — F33.1 MODERATE EPISODE OF RECURRENT MAJOR DEPRESSIVE DISORDER (HCC): ICD-10-CM

## 2024-05-22 PROCEDURE — RXMED WILLOW AMBULATORY MEDICATION CHARGE

## 2024-05-22 RX ORDER — FLUOXETINE HYDROCHLORIDE 40 MG/1
40 CAPSULE ORAL 2 TIMES DAILY
Qty: 180 CAPSULE | Refills: 1 | Status: SHIPPED | OUTPATIENT
Start: 2024-05-22 | End: 2024-11-18

## 2024-05-22 NOTE — TELEPHONE ENCOUNTER
Received request via: Pharmacy    Was the patient seen in the last year in this department? Yes    Does the patient have an active prescription (recently filled or refills available) for medication(s) requested? No    Pharmacy Name: [facility]     Does the patient have half-way Plus and need 100 day supply (blood pressure, diabetes and cholesterol meds only)? Patient does not have SCP

## 2024-05-23 ENCOUNTER — APPOINTMENT (OUTPATIENT)
Dept: MEDICAL GROUP | Facility: IMAGING CENTER | Age: 54
End: 2024-05-23
Payer: COMMERCIAL

## 2024-05-23 ENCOUNTER — PHARMACY VISIT (OUTPATIENT)
Dept: PHARMACY | Facility: MEDICAL CENTER | Age: 54
End: 2024-05-23
Payer: COMMERCIAL

## 2024-05-29 ENCOUNTER — TELEMEDICINE (OUTPATIENT)
Dept: MEDICAL GROUP | Facility: IMAGING CENTER | Age: 54
End: 2024-05-29
Payer: COMMERCIAL

## 2024-05-29 VITALS — HEIGHT: 70 IN | WEIGHT: 260 LBS | TEMPERATURE: 96.7 F | BODY MASS INDEX: 37.22 KG/M2

## 2024-05-29 DIAGNOSIS — F41.9 ANXIETY: ICD-10-CM

## 2024-05-29 DIAGNOSIS — Z00.00 WELLNESS EXAMINATION: Chronic | ICD-10-CM

## 2024-05-29 DIAGNOSIS — I49.3 SYMPTOMATIC PVCS: ICD-10-CM

## 2024-05-29 DIAGNOSIS — F13.282: ICD-10-CM

## 2024-05-29 DIAGNOSIS — E66.9 OBESITY (BMI 30-39.9): ICD-10-CM

## 2024-05-29 DIAGNOSIS — Z12.31 ENCOUNTER FOR SCREENING MAMMOGRAM FOR MALIGNANT NEOPLASM OF BREAST: ICD-10-CM

## 2024-05-29 DIAGNOSIS — F33.1 MODERATE EPISODE OF RECURRENT MAJOR DEPRESSIVE DISORDER (HCC): ICD-10-CM

## 2024-05-29 DIAGNOSIS — E66.01 MORBID (SEVERE) OBESITY DUE TO EXCESS CALORIES (HCC): ICD-10-CM

## 2024-05-29 RX ORDER — LORAZEPAM 1 MG/1
1 TABLET ORAL EVERY 4 HOURS PRN
Qty: 30 TABLET | Refills: 0 | Status: SHIPPED | OUTPATIENT
Start: 2024-05-29 | End: 2024-06-28

## 2024-05-29 ASSESSMENT — ANXIETY QUESTIONNAIRES
5. BEING SO RESTLESS THAT IT IS HARD TO SIT STILL: SEVERAL DAYS
1. FEELING NERVOUS, ANXIOUS, OR ON EDGE: NEARLY EVERY DAY
2. NOT BEING ABLE TO STOP OR CONTROL WORRYING: MORE THAN HALF THE DAYS
3. WORRYING TOO MUCH ABOUT DIFFERENT THINGS: NEARLY EVERY DAY
7. FEELING AFRAID AS IF SOMETHING AWFUL MIGHT HAPPEN: SEVERAL DAYS
6. BECOMING EASILY ANNOYED OR IRRITABLE: MORE THAN HALF THE DAYS
GAD7 TOTAL SCORE: 14
4. TROUBLE RELAXING: MORE THAN HALF THE DAYS

## 2024-05-29 ASSESSMENT — PATIENT HEALTH QUESTIONNAIRE - PHQ9
CLINICAL INTERPRETATION OF PHQ2 SCORE: 2
SUM OF ALL RESPONSES TO PHQ QUESTIONS 1-9: 8
5. POOR APPETITE OR OVEREATING: 0 - NOT AT ALL

## 2024-05-29 ASSESSMENT — FIBROSIS 4 INDEX: FIB4 SCORE: 0.49

## 2024-05-29 NOTE — PROGRESS NOTES
Virtual Visit: Established Patient   This visit was conducted via Zoom using secure and encrypted videoconferencing technology.   The patient was in their home in the [facility].    The patient's identity was confirmed and verbal consent was obtained for this virtual visit.    Subjective:   CC:   Chief Complaint   Patient presents with    Anxiety     Consult on the medications     Luz Zuñiga is a 53 y.o. 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 She is taking Prozac 80 mg daily.  Patient is tolerating med well without side effects.  Patient admits exacerbation of her anxiety from increased life and work related stress. This is causing insomnia.  She is requesting refill on lorazepam which she takes sparingly on severe cases only.  She was not able to establish with behavioral health.  She is requesting referral.  Denies intent to harm self of others. NO recent mental health hospitalizations.    Patient is due for annual labs.    Gen: no fevers/chills  Pulm: no sob, no cough  CV: no chest pain, no palpitations, no edema  GI: no nausea/vomiting, no diarrhea  Skin: no rash      Current medicines (including changes today)  Current Outpatient Medications   Medication Sig Dispense Refill    LORazepam (ATIVAN) 1 MG Tab Take 1 Tablet by mouth every four hours as needed for Anxiety for up to 30 days. 30 Tablet 0    fluoxetine (PROZAC) 40 MG capsule Take 1 Capsule by mouth 2 times a day for 180 days. 180 Capsule 1    ibuprofen (MOTRIN) 600 MG Tab Take 1 Tablet by mouth every 6 hours as needed for Moderate Pain. 30 Tablet 0    metronidazole (METROGEL) 0.75 % gel Apply 1 Application topically 2 times a day. 45 g 1     No current facility-administered medications for this visit.       Patient Active Problem List    Diagnosis  "Date Noted    Morbid (severe) obesity due to excess calories (HCC) 05/29/2024    Body mass index (BMI) 37.0-37.9, adult 05/29/2024    Sedative, hypnotic or anxiolytic dependence with sedative, hypnotic or anxiolytic-induced sleep disorder (HCC) 05/29/2024    Migraine 01/12/2021    Pharyngeal dysphagia 12/27/2018    Throat discomfort 12/27/2018    Sinusitis 12/06/2018    Anxiety 05/15/2018    Heartburn 05/15/2018    Polycystic ovary syndrome 05/15/2018    PCOS (polycystic ovarian syndrome) 05/19/2017    Neck pain 03/27/2017    Elbow pain, left 02/08/2017    Symptomatic PVCs 07/07/2015    Nystagmus associated with disorders of the vestibular system 11/30/2011    Papanicolaou smear of cervix with atypical squamous cells of undetermined significance (ASC-US) 12/24/2009    Premenstrual tension syndrome 11/11/2009    Trochanteric bursitis of right hip 09/21/2007    Gastroesophageal reflux disease 02/27/2007    Hypothyroidism 02/27/2007    Obesity 01/28/2004    Episode of recurrent major depressive disorder (HCC) 08/09/2000    Intracranial injury (HCC) 08/24/1999        Objective:   Temp 35.9 °C (96.7 °F) Comment: 1/9/2024  Ht 1.778 m (5' 10\")   Wt 118 kg (260 lb)   LMP 05/25/2024   BMI 37.31 kg/m²     Physical Exam:  Constitutional: Alert, no distress, well-groomed.  Skin: No rashes in visible areas.  Eye: Round. Conjunctiva clear, lids normal. No icterus.   ENMT: Lips pink without lesions, good dentition, moist mucous membranes. Phonation normal.  Neck: No masses, no thyromegaly. Moves freely without pain.  Respiratory: Unlabored respiratory effort, no cough or audible wheeze  Psych: Alert and oriented x3, normal affect and mood.     Assessment and Plan:   The following treatment plan was discussed:     1. Anxiety  2. Sedative, hypnotic or anxiolytic dependence with sedative, hypnotic or anxiolytic-induced sleep disorder (HCC)  Acute on chronic condition, uncontrolled. No SI/Hi.  Will refill lorazepam 1 mg as needed " for severe anxiety.  Advised to avoid alcoholic beverages or other controlled substances when taking benzodiazapine . Risk of tolerance, dependence and withdrawal reviewed.  Discussed mindfulness-based stress reduction, exercise, accupuncture treatments in addition to pharmacologic treatment options.  Will refer to behavioral health.  Obtained and reviewed patient utilization report from [facility] pharmacy database on 5/29/2024 2:59 PM  prior to writing prescription for controlled substance II, III or IV per [place] bill . Based on assessment of the report, the prescription is medically necessary.     - LORazepam (ATIVAN) 1 MG Tab; Take 1 Tablet by mouth every four hours as needed for Anxiety for up to 30 days.  Dispense: 30 Tablet; Refill: 0  - Referral to Behavioral Health    3. Moderate episode of recurrent major depressive disorder (HCC)  Chronic and controlled condition on fluoxetine 80 mg daily. NO SI/HI  Recommend to continue current regimen.  Will refer to behavioral health.  ED symptoms discussed.    - Referral to Behavioral Health    4. Wellness examination  PMH/PSH/FH/Social history reviewed. Medication reconciled. Vaccinations discussed. Previous records and labs reviewed. Discussed age appropriate anticipatory guidance.  Will screen for anemia, thyroid disorder, metabolic disorder, cardiovascular disease, diabetes, and vitamin deficiency. Will order labs.    - CBC WITH DIFFERENTIAL; Future  - Comp Metabolic Panel; Future  - Lipid Profile; Future  - TSH WITH REFLEX TO FT4; Future  - VITAMIN D,25 HYDROXY (DEFICIENCY); Future  - HEMOGLOBIN A1C; Future    5. Obesity (BMI 30-39.9)  6. Body mass index (BMI) 37.0-37.9, adult  Discussed lifestyle and dietary changes such as low-carb diet, high in vegetables and fresh fruits.  Encouraged to increase water intake.  Regular physical exercise at least 30 minutes/day 5 days a week. Weight reduction if applicable (aim for 5% to 10% body weight increments).  Patient is at an increased risk for serious conditions such as heart disease, type 2 diabetes, TRELL, certain types of cancers, gallbladder disease, and circulation problems. Will order labs to rule out.     - CBC WITH DIFFERENTIAL; Future  - Comp Metabolic Panel; Future  - Lipid Profile; Future  - TSH WITH REFLEX TO FT4; Future  - VITAMIN D,25 HYDROXY (DEFICIENCY); Future  - HEMOGLOBIN A1C; Future    7. Symptomatic PVCs  Chronic, stable condition. Currently asymptomatic. Will continue to monitor.     8. Encounter for screening mammogram for malignant neoplasm of breast    - MA-SCREENING MAMMO BILAT W/TOMOSYNTHESIS W/CAD; Future    HCC Gap Form    Diagnosis: E66.01 - Morbid (severe) obesity due to excess calories (HCC)  Z68.37 - Body mass index (BMI) 37.0-37.9, adult  Comorbidity Diagnosis: Symptomatic PVCs  The current BMI is 37.31 kg/m2 as of 05/29/24 09:41 PDT  Assessment and plan: Chronic, stable. Encouraged healthy diet and physical activity changes with a goal of weight loss. Follow up at least annually. The comorbid condition is stable based on today's assessment. Continue current treatment plan. Follow up at least yearly.  Diagnosis to address: F33.1 - Moderate episode of recurrent major depressive disorder (HCC)  Assessment and plan: Chronic, stable. Continue with current defined treatment plan: prozac. Follow-up at least annually.  Diagnosis: F13.282 - Sedative, hypnotic or anxiolytic dependence with sedative, hypnotic or anxiolytic-induced sleep disorder (HCC)  Assessment and plan: Chronic, stable. Continue with current defined treatment plan: lorazepam prn. Follow-up at least annually.  Last edited 05/29/24 09:58 PDT by MARY ANN Mckay         Medical Decision Making/Course:  In the course of preparing for this visit with review of the pertinent past medical history, recent and past clinic visits, current medications, and performing chart, immunization, medical history and medication  reconciliation, and in the further course of obtaining the current history pertinent to the clinic visit today, performing an exam and evaluation, ordering and independently evaluating labs, tests, and/or procedures, prescribing any recommended new medications as noted above, providing any pertinent counseling and education and recommending further coordination of care. This was discussed with patient in a shared-decision making conversation, and they understand and agreed with plan of care.     Follow-up: Return in about 4 weeks (around 6/26/2024), or if symptoms worsen or fail to improve, for f/u labs.    Thank you, Katja MADERA  [facility]

## 2024-06-03 ENCOUNTER — PHARMACY VISIT (OUTPATIENT)
Dept: PHARMACY | Facility: MEDICAL CENTER | Age: 54
End: 2024-06-03
Payer: COMMERCIAL

## 2024-06-03 PROCEDURE — RXMED WILLOW AMBULATORY MEDICATION CHARGE

## 2024-06-05 ENCOUNTER — APPOINTMENT (OUTPATIENT)
Dept: RADIOLOGY | Facility: MEDICAL CENTER | Age: 54
End: 2024-06-05
Payer: COMMERCIAL

## 2024-06-11 ENCOUNTER — HOSPITAL ENCOUNTER (OUTPATIENT)
Dept: RADIOLOGY | Facility: MEDICAL CENTER | Age: 54
End: 2024-06-11
Payer: COMMERCIAL

## 2024-06-13 ENCOUNTER — OFFICE VISIT (OUTPATIENT)
Dept: BEHAVIORAL HEALTH | Facility: CLINIC | Age: 54
End: 2024-06-13
Payer: COMMERCIAL

## 2024-06-13 DIAGNOSIS — F43.22 ADJUSTMENT DISORDER WITH ANXIETY: ICD-10-CM

## 2024-06-13 PROCEDURE — 90791 PSYCH DIAGNOSTIC EVALUATION: CPT | Performed by: MARRIAGE & FAMILY THERAPIST
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
## 2024-06-14 ENCOUNTER — HOSPITAL ENCOUNTER (OUTPATIENT)
Dept: RADIOLOGY | Facility: MEDICAL CENTER | Age: 54
End: 2024-06-14
Payer: COMMERCIAL

## 2024-06-14 DIAGNOSIS — Z12.31 ENCOUNTER FOR SCREENING MAMMOGRAM FOR MALIGNANT NEOPLASM OF BREAST: ICD-10-CM

## 2024-06-14 PROCEDURE — 77063 BREAST TOMOSYNTHESIS BI: CPT

## 2024-06-17 ENCOUNTER — OFFICE VISIT (OUTPATIENT)
Dept: BEHAVIORAL HEALTH | Facility: CLINIC | Age: 54
End: 2024-06-17
Payer: COMMERCIAL

## 2024-06-17 DIAGNOSIS — F43.22 ADJUSTMENT DISORDER WITH ANXIETY: ICD-10-CM

## 2024-06-17 PROCEDURE — 90837 PSYTX W PT 60 MINUTES: CPT | Performed by: MARRIAGE & FAMILY THERAPIST

## 2024-06-17 NOTE — PROGRESS NOTES
[facility]   Therapy Progress Note    Name: Luz Zuñiga  MRN: [id number]  : 1970  Age: 53 y.o.  Date of assessment: 2024  PCP: MARY ANN Mckay  Persons in attendance: Patient  Total session time: 53 min    Pt reports: she's feeling particularly 'shaky, I just consulted with my atty, who is going on vacation next couple weeks,' but Pt/ have a plan ongoing, and an appt scheduled for when he gets back int o the office. She cont to work with her atty about the status of her employment/the option she was given to accept a severance or a PIP,. Pt never had hope or desire to return to work, even if she was invited to do so. She's not hearing anything from her [former] employer. Her atty advised her to not contact her [former] employer. Pt  confident her atty is working for her, and doing a good job.    The big issue for me, now, is 'no money coming in.' She will apply for unemployment, is looking for and applying for jobs, has a little savings, family and friends who she knows will support her.    An unexpected, sudden, 'poorly timed break-up happened; a 1-2-3 punch!' Wherein the man Pt has been dating past 1-1/2 y broke up with her in the past week as well: 'too much drama'    Doing for self? Time with friends, 'moving the ball down field in my divorce,' taking care of tasks, not too much self-care re: exercise, sleep, but some meditation.    I asked Pt 'What can I do for you?' She answered 'refer me to a psychiatrist.      Objective Observations:   Participation:Active verbal participation   Grooming:Casual and Neat   Cognition:Fully Oriented   Eye Contact:Good   Mood: anxious and tearful, given recent events   Affect:Full range   Thought Process:Goal-directed   Speech:Rate within normal limits    Current Risk:   Suicide: low   Homicide: low   Self-Harm: low     Evaluation: Pt working with her , tracking all aspects of the unfolding legal case, is motivated,yet she  recognizes she needs to find ways to take care of herself--outside of being a parent, an unemployed person and a person involved in a divorce.    Plan: wants referral to psychiatrist at [facility]; Cont to provide support and encouragement    Care Plan Updated: Yes    Does patient express agreement with the treatment plan? Yes     Diagnosis: F43.22 Adjustment Disorder with Anxiety     SARAH Adkins

## 2024-06-18 ENCOUNTER — OFFICE VISIT (OUTPATIENT)
Dept: BEHAVIORAL HEALTH | Facility: CLINIC | Age: 54
End: 2024-06-18
Payer: COMMERCIAL

## 2024-06-18 DIAGNOSIS — F41.9 ANXIETY: ICD-10-CM

## 2024-06-18 PROCEDURE — 90792 PSYCH DIAG EVAL W/MED SRVCS: CPT | Performed by: PSYCHIATRY & NEUROLOGY

## 2024-06-18 PROCEDURE — RXMED WILLOW AMBULATORY MEDICATION CHARGE: Performed by: PSYCHIATRY & NEUROLOGY

## 2024-06-18 RX ORDER — LORAZEPAM 1 MG/1
1 TABLET ORAL 2 TIMES DAILY
Qty: 60 TABLET | Refills: 0 | Status: SHIPPED | OUTPATIENT
Start: 2024-06-18 | End: 2024-07-26
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
## 2024-06-26 ENCOUNTER — PHARMACY VISIT (OUTPATIENT)
Dept: PHARMACY | Facility: MEDICAL CENTER | Age: 54
End: 2024-06-26
Payer: COMMERCIAL

## 2024-06-28 ENCOUNTER — OFFICE VISIT (OUTPATIENT)
Dept: MEDICAL GROUP | Facility: IMAGING CENTER | Age: 54
End: 2024-06-28
Payer: COMMERCIAL

## 2024-06-28 VITALS
HEART RATE: 71 BPM | RESPIRATION RATE: 15 BRPM | HEIGHT: 70 IN | SYSTOLIC BLOOD PRESSURE: 136 MMHG | BODY MASS INDEX: 38.39 KG/M2 | DIASTOLIC BLOOD PRESSURE: 100 MMHG | OXYGEN SATURATION: 97 % | WEIGHT: 268.2 LBS | TEMPERATURE: 98.1 F

## 2024-06-28 DIAGNOSIS — I10 ESSENTIAL HYPERTENSION: ICD-10-CM

## 2024-06-28 DIAGNOSIS — R09.82 POST-NASAL DRAINAGE: ICD-10-CM

## 2024-06-28 DIAGNOSIS — H61.22 IMPACTED CERUMEN, LEFT EAR: ICD-10-CM

## 2024-06-28 DIAGNOSIS — H93.8X2 EAR FULLNESS, LEFT: ICD-10-CM

## 2024-06-28 PROCEDURE — 3080F DIAST BP >= 90 MM HG: CPT | Performed by: FAMILY MEDICINE

## 2024-06-28 PROCEDURE — 3075F SYST BP GE 130 - 139MM HG: CPT | Performed by: FAMILY MEDICINE

## 2024-06-28 PROCEDURE — 99214 OFFICE O/P EST MOD 30 MIN: CPT | Performed by: FAMILY MEDICINE

## 2024-06-28 RX ORDER — AMLODIPINE BESYLATE 2.5 MG/1
2.5 TABLET ORAL DAILY
Qty: 30 TABLET | Refills: 1 | Status: SHIPPED | OUTPATIENT
Start: 2024-06-28

## 2024-06-28 RX ORDER — FLUTICASONE PROPIONATE 50 MCG
1 SPRAY, SUSPENSION (ML) NASAL DAILY
COMMUNITY

## 2024-06-28 ASSESSMENT — FIBROSIS 4 INDEX: FIB4 SCORE: 0.5

## 2024-06-28 ASSESSMENT — PAIN SCALES - GENERAL: PAINLEVEL: NO PAIN

## 2024-06-28 NOTE — PROGRESS NOTES
SUBJECTIVE:    Chief Complaint   Patient presents with    Ear Fullness     Left ear is totally plugged OTC ear drops not working started last friday    Nasal Congestion     Pressure in the face and ears.  Flonase at night but still waking up with post nasal drip, coughing for the first hour in the morning. Feels like swollen sinuses and gooey eyes      HPI:     Luz Zuñiga is a 54 y.o. female here for left ear fullness.   Past week, woke up with left ear issues.   Has tried ear wax drops, hydrogen peroxide and flonase.   Prior similar issues. Some pressure and a little dizziness.   Slight sinus symptoms, pressures, congestion. Since back in [place], more allergy symptoms.   No oral allergy medication.     Elevate BP- mostly diastolic.   Hx of elevation.   No prior sleep test.   Nasal congestion.     BP elevated- no decongetants.   Lost job.       ROS:  No recent fevers or chills.  No sore throat.  No nausea or vomiting. No diarrhea. No chest pains or shortness of breath. No lower extremity edema.    Current Outpatient Medications on File Prior to Visit   Medication Sig Dispense Refill    fluticasone (FLONASE) 50 MCG/ACT nasal spray Administer 1 Spray into affected nostril(S) every day.      LORazepam (ATIVAN) 1 MG Tab Take 1 Tablet by mouth 2 times a day for 30 days. 60 Tablet 0    fluoxetine (PROZAC) 40 MG capsule Take 1 Capsule by mouth 2 times a day for 180 days. 180 Capsule 1    metronidazole (METROGEL) 0.75 % gel Apply 1 Application topically 2 times a day. 45 g 1    ibuprofen (MOTRIN) 600 MG Tab Take 1 Tablet by mouth every 6 hours as needed for Moderate Pain. 30 Tablet 0     No current facility-administered medications on file prior to visit.       No Known Allergies    Patient Active Problem List    Diagnosis Date Noted    Morbid (severe) obesity due to excess calories (HCC) 05/29/2024    Body mass index (BMI) 37.0-37.9, adult 05/29/2024    Sedative, hypnotic or anxiolytic dependence with sedative, hypnotic  "or anxiolytic-induced sleep disorder (HCC) 05/29/2024    Migraine 01/12/2021    Pharyngeal dysphagia 12/27/2018    Throat discomfort 12/27/2018    Sinusitis 12/06/2018    Anxiety 05/15/2018    Heartburn 05/15/2018    Polycystic ovary syndrome 05/15/2018    PCOS (polycystic ovarian syndrome) 05/19/2017    Neck pain 03/27/2017    Elbow pain, left 02/08/2017    Symptomatic PVCs 07/07/2015    Nystagmus associated with disorders of the vestibular system 11/30/2011    Papanicolaou smear of cervix with atypical squamous cells of undetermined significance (ASC-US) 12/24/2009    Premenstrual tension syndrome 11/11/2009    Trochanteric bursitis of right hip 09/21/2007    Gastroesophageal reflux disease 02/27/2007    Hypothyroidism 02/27/2007    Obesity 01/28/2004    Episode of recurrent major depressive disorder (HCC) 08/09/2000    Intracranial injury (HCC) 08/24/1999       Past Medical History:   Diagnosis Date    Anxiety     Depression     Thyroid disease        OBJECTIVE:   BP (!) 136/100 (BP Location: Left arm, Patient Position: Sitting, BP Cuff Size: Adult)   Pulse 71   Temp 36.7 °C (98.1 °F) (Temporal)   Resp 15   Ht 1.778 m (5' 10\")   Wt 122 kg (268 lb 3.2 oz)   LMP 05/25/2024   SpO2 97%   BMI 38.48 kg/m²   General: Well-developed well-nourished female, no acute distress  HEENT: oropharynx clear, eyes clear, TMs clear and intact on right, left side cerumen impaction.   Neck: supple, no lymphadenopathy- cervical or supraclavicular, no thyromegaly  Cardiovascular: regular rate and rhythm, no murmurs, gallops, rubs  Lungs: clear to auscultation bilaterally, no wheezes, crackles, or rhonchi  Abdomen: +bowel sounds, soft, nontender, nondistended, no rebound, no guarding, no hepatosplenomegaly  Extremities: no cyanosis, clubbing, edema  Skin: Warm and dry  Psych: appropriate mood and affect        ASSESSMENT/PLAN:    54 y.o.female       1. Essential hypertension  amLODIPine (NORVASC) 2.5 MG Tab        Home sleep " test.     No follow-ups on file.    This medical record contains text that has been entered with the assistance of computer voice recognition and dictation software.  Therefore, it may contain unintended errors in text, spelling, punctuation, or grammar.

## 2024-07-03 ENCOUNTER — APPOINTMENT (OUTPATIENT)
Dept: BEHAVIORAL HEALTH | Facility: CLINIC | Age: 54
End: 2024-07-03

## 2024-07-10 ENCOUNTER — APPOINTMENT (OUTPATIENT)
Dept: BEHAVIORAL HEALTH | Facility: CLINIC | Age: 54
End: 2024-07-10

## 2024-07-18 ENCOUNTER — APPOINTMENT (OUTPATIENT)
Dept: MEDICAL GROUP | Facility: IMAGING CENTER | Age: 54
End: 2024-07-18

## 2024-07-29 ENCOUNTER — APPOINTMENT (OUTPATIENT)
Dept: MEDICAL GROUP | Facility: IMAGING CENTER | Age: 54
End: 2024-07-29

## 2024-09-24 DIAGNOSIS — R21 FACIAL RASH: ICD-10-CM

## 2024-09-25 RX ORDER — METRONIDAZOLE 7.5 MG/G
1 GEL TOPICAL 2 TIMES DAILY
Qty: 45 G | Refills: 1 | Status: SHIPPED | OUTPATIENT
Start: 2024-09-25

## 2024-09-25 NOTE — TELEPHONE ENCOUNTER
Received request via: Patient    Was the patient seen in the last year in this department? Yes    Does the patient have an active prescription (recently filled or refills available) for medication(s) requested? No    Pharmacy Name: [facility] #[id number]     Does the patient have nursing home Plus and need 100-day supply? (This applies to ALL medications) Patient does not have SCP